# Patient Record
Sex: FEMALE | Race: WHITE | NOT HISPANIC OR LATINO | Employment: OTHER | URBAN - METROPOLITAN AREA
[De-identification: names, ages, dates, MRNs, and addresses within clinical notes are randomized per-mention and may not be internally consistent; named-entity substitution may affect disease eponyms.]

---

## 2024-07-15 ENCOUNTER — HOSPITAL ENCOUNTER (OUTPATIENT)
Facility: CLINIC | Age: 84
Setting detail: OBSERVATION
Discharge: NURSING, REHAB OR LONG-TERM CARE | End: 2024-07-17
Attending: EMERGENCY MEDICINE | Admitting: INTERNAL MEDICINE
Payer: COMMERCIAL

## 2024-07-15 DIAGNOSIS — S32.592A PUBIC RAMUS FRACTURE, LEFT, CLOSED, INITIAL ENCOUNTER (H): ICD-10-CM

## 2024-07-15 DIAGNOSIS — R29.6 FALLS FREQUENTLY: ICD-10-CM

## 2024-07-15 DIAGNOSIS — M84.48XA BILATERAL SACRAL INSUFFICIENCY FRACTURE, INITIAL ENCOUNTER: ICD-10-CM

## 2024-07-15 LAB
ALBUMIN UR-MCNC: NEGATIVE MG/DL
ANION GAP SERPL CALCULATED.3IONS-SCNC: 11 MMOL/L (ref 7–15)
APPEARANCE UR: CLEAR
BASOPHILS # BLD AUTO: 0.1 10E3/UL (ref 0–0.2)
BASOPHILS NFR BLD AUTO: 1 %
BILIRUB UR QL STRIP: NEGATIVE
BUN SERPL-MCNC: 62.4 MG/DL (ref 8–23)
CALCIUM SERPL-MCNC: 9.2 MG/DL (ref 8.8–10.2)
CHLORIDE SERPL-SCNC: 103 MMOL/L (ref 98–107)
COLOR UR AUTO: YELLOW
CREAT SERPL-MCNC: 2.24 MG/DL (ref 0.51–0.95)
EGFRCR SERPLBLD CKD-EPI 2021: 21 ML/MIN/1.73M2
EOSINOPHIL # BLD AUTO: 0.1 10E3/UL (ref 0–0.7)
EOSINOPHIL NFR BLD AUTO: 1 %
ERYTHROCYTE [DISTWIDTH] IN BLOOD BY AUTOMATED COUNT: 16.4 % (ref 10–15)
GLUCOSE SERPL-MCNC: 112 MG/DL (ref 70–99)
GLUCOSE UR STRIP-MCNC: NEGATIVE MG/DL
HCO3 SERPL-SCNC: 21 MMOL/L (ref 22–29)
HCT VFR BLD AUTO: 32.6 % (ref 35–47)
HGB BLD-MCNC: 10.3 G/DL (ref 11.7–15.7)
HGB UR QL STRIP: NEGATIVE
HYALINE CASTS: 1 /LPF
IMM GRANULOCYTES # BLD: 0.1 10E3/UL
IMM GRANULOCYTES NFR BLD: 1 %
KETONES UR STRIP-MCNC: NEGATIVE MG/DL
LEUKOCYTE ESTERASE UR QL STRIP: NEGATIVE
LYMPHOCYTES # BLD AUTO: 1.8 10E3/UL (ref 0.8–5.3)
LYMPHOCYTES NFR BLD AUTO: 20 %
MCH RBC QN AUTO: 28.8 PG (ref 26.5–33)
MCHC RBC AUTO-ENTMCNC: 31.6 G/DL (ref 31.5–36.5)
MCV RBC AUTO: 91 FL (ref 78–100)
MONOCYTES # BLD AUTO: 0.6 10E3/UL (ref 0–1.3)
MONOCYTES NFR BLD AUTO: 7 %
MUCOUS THREADS #/AREA URNS LPF: PRESENT /LPF
NEUTROPHILS # BLD AUTO: 6.3 10E3/UL (ref 1.6–8.3)
NEUTROPHILS NFR BLD AUTO: 70 %
NITRATE UR QL: NEGATIVE
NRBC # BLD AUTO: 0 10E3/UL
NRBC BLD AUTO-RTO: 0 /100
PH UR STRIP: 5 [PH] (ref 5–7)
PLATELET # BLD AUTO: 223 10E3/UL (ref 150–450)
POTASSIUM SERPL-SCNC: 4.4 MMOL/L (ref 3.4–5.3)
RBC # BLD AUTO: 3.58 10E6/UL (ref 3.8–5.2)
RBC URINE: 1 /HPF
SODIUM SERPL-SCNC: 135 MMOL/L (ref 135–145)
SP GR UR STRIP: 1.02 (ref 1–1.03)
SQUAMOUS EPITHELIAL: 1 /HPF
UROBILINOGEN UR STRIP-MCNC: NORMAL MG/DL
WBC # BLD AUTO: 9 10E3/UL (ref 4–11)
WBC URINE: 1 /HPF

## 2024-07-15 PROCEDURE — 99285 EMERGENCY DEPT VISIT HI MDM: CPT | Mod: 25 | Performed by: EMERGENCY MEDICINE

## 2024-07-15 PROCEDURE — 96374 THER/PROPH/DIAG INJ IV PUSH: CPT

## 2024-07-15 PROCEDURE — 99285 EMERGENCY DEPT VISIT HI MDM: CPT | Mod: 25

## 2024-07-15 PROCEDURE — 36415 COLL VENOUS BLD VENIPUNCTURE: CPT | Performed by: EMERGENCY MEDICINE

## 2024-07-15 PROCEDURE — 120N000001 HC R&B MED SURG/OB

## 2024-07-15 PROCEDURE — 258N000003 HC RX IP 258 OP 636: Performed by: INTERNAL MEDICINE

## 2024-07-15 PROCEDURE — 250N000011 HC RX IP 250 OP 636: Performed by: EMERGENCY MEDICINE

## 2024-07-15 PROCEDURE — 81001 URINALYSIS AUTO W/SCOPE: CPT | Performed by: EMERGENCY MEDICINE

## 2024-07-15 PROCEDURE — 96361 HYDRATE IV INFUSION ADD-ON: CPT

## 2024-07-15 PROCEDURE — 80048 BASIC METABOLIC PNL TOTAL CA: CPT | Performed by: EMERGENCY MEDICINE

## 2024-07-15 PROCEDURE — 85048 AUTOMATED LEUKOCYTE COUNT: CPT | Performed by: EMERGENCY MEDICINE

## 2024-07-15 RX ORDER — LATANOPROST 50 UG/ML
1 SOLUTION/ DROPS OPHTHALMIC DAILY
COMMUNITY

## 2024-07-15 RX ORDER — NALOXONE HYDROCHLORIDE 0.4 MG/ML
0.2 INJECTION, SOLUTION INTRAMUSCULAR; INTRAVENOUS; SUBCUTANEOUS
Status: DISCONTINUED | OUTPATIENT
Start: 2024-07-15 | End: 2024-07-17 | Stop reason: HOSPADM

## 2024-07-15 RX ORDER — NALOXONE HYDROCHLORIDE 0.4 MG/ML
0.4 INJECTION, SOLUTION INTRAMUSCULAR; INTRAVENOUS; SUBCUTANEOUS
Status: DISCONTINUED | OUTPATIENT
Start: 2024-07-15 | End: 2024-07-17 | Stop reason: HOSPADM

## 2024-07-15 RX ORDER — TIMOLOL MALEATE 5 MG/ML
1 SOLUTION/ DROPS OPHTHALMIC DAILY
COMMUNITY

## 2024-07-15 RX ORDER — ENOXAPARIN SODIUM 100 MG/ML
30 INJECTION SUBCUTANEOUS
Status: DISCONTINUED | OUTPATIENT
Start: 2024-07-15 | End: 2024-07-17 | Stop reason: HOSPADM

## 2024-07-15 RX ORDER — LATANOPROST 50 UG/ML
1 SOLUTION/ DROPS OPHTHALMIC AT BEDTIME
Status: DISCONTINUED | OUTPATIENT
Start: 2024-07-16 | End: 2024-07-17 | Stop reason: HOSPADM

## 2024-07-15 RX ORDER — LISINOPRIL 20 MG/1
20 TABLET ORAL DAILY
Status: ON HOLD | COMMUNITY
End: 2024-07-17

## 2024-07-15 RX ORDER — LISINOPRIL 10 MG/1
20 TABLET ORAL DAILY
Status: DISCONTINUED | OUTPATIENT
Start: 2024-07-16 | End: 2024-07-17

## 2024-07-15 RX ORDER — ONDANSETRON 2 MG/ML
4 INJECTION INTRAMUSCULAR; INTRAVENOUS EVERY 6 HOURS PRN
Status: DISCONTINUED | OUTPATIENT
Start: 2024-07-15 | End: 2024-07-17 | Stop reason: HOSPADM

## 2024-07-15 RX ORDER — ACETAMINOPHEN 325 MG/1
650 TABLET ORAL EVERY 4 HOURS PRN
Status: DISCONTINUED | OUTPATIENT
Start: 2024-07-15 | End: 2024-07-17 | Stop reason: HOSPADM

## 2024-07-15 RX ORDER — PROCHLORPERAZINE MALEATE 5 MG
5 TABLET ORAL EVERY 6 HOURS PRN
Status: DISCONTINUED | OUTPATIENT
Start: 2024-07-15 | End: 2024-07-17 | Stop reason: HOSPADM

## 2024-07-15 RX ORDER — CIPROFLOXACIN 500 MG/1
500 TABLET, FILM COATED ORAL EVERY 24 HOURS
Status: DISCONTINUED | OUTPATIENT
Start: 2024-07-16 | End: 2024-07-16

## 2024-07-15 RX ORDER — CALCIUM CARBONATE 500 MG/1
1000 TABLET, CHEWABLE ORAL 4 TIMES DAILY PRN
Status: DISCONTINUED | OUTPATIENT
Start: 2024-07-15 | End: 2024-07-16

## 2024-07-15 RX ORDER — SODIUM CHLORIDE, SODIUM LACTATE, POTASSIUM CHLORIDE, CALCIUM CHLORIDE 600; 310; 30; 20 MG/100ML; MG/100ML; MG/100ML; MG/100ML
INJECTION, SOLUTION INTRAVENOUS CONTINUOUS
Status: ACTIVE | OUTPATIENT
Start: 2024-07-15 | End: 2024-07-16

## 2024-07-15 RX ORDER — AMOXICILLIN 250 MG
1 CAPSULE ORAL 2 TIMES DAILY PRN
Status: DISCONTINUED | OUTPATIENT
Start: 2024-07-15 | End: 2024-07-17 | Stop reason: HOSPADM

## 2024-07-15 RX ORDER — LEVOTHYROXINE SODIUM 88 UG/1
88 TABLET ORAL DAILY
COMMUNITY

## 2024-07-15 RX ORDER — TIMOLOL MALEATE 5 MG/ML
1 SOLUTION/ DROPS OPHTHALMIC DAILY
Status: DISCONTINUED | OUTPATIENT
Start: 2024-07-16 | End: 2024-07-17 | Stop reason: HOSPADM

## 2024-07-15 RX ORDER — LIDOCAINE 40 MG/G
CREAM TOPICAL
Status: DISCONTINUED | OUTPATIENT
Start: 2024-07-15 | End: 2024-07-17 | Stop reason: HOSPADM

## 2024-07-15 RX ORDER — FENTANYL CITRATE 50 UG/ML
50 INJECTION, SOLUTION INTRAMUSCULAR; INTRAVENOUS ONCE
Status: COMPLETED | OUTPATIENT
Start: 2024-07-15 | End: 2024-07-15

## 2024-07-15 RX ORDER — MORPHINE SULFATE 2 MG/ML
1 INJECTION, SOLUTION INTRAMUSCULAR; INTRAVENOUS
Status: DISCONTINUED | OUTPATIENT
Start: 2024-07-15 | End: 2024-07-15

## 2024-07-15 RX ORDER — HYDROCODONE BITARTRATE AND ACETAMINOPHEN 5; 325 MG/1; MG/1
1-2 TABLET ORAL EVERY 4 HOURS PRN
Status: DISCONTINUED | OUTPATIENT
Start: 2024-07-15 | End: 2024-07-17 | Stop reason: HOSPADM

## 2024-07-15 RX ORDER — AMOXICILLIN 250 MG
2 CAPSULE ORAL 2 TIMES DAILY PRN
Status: DISCONTINUED | OUTPATIENT
Start: 2024-07-15 | End: 2024-07-17 | Stop reason: HOSPADM

## 2024-07-15 RX ORDER — ONDANSETRON 4 MG/1
4 TABLET, ORALLY DISINTEGRATING ORAL EVERY 6 HOURS PRN
Status: DISCONTINUED | OUTPATIENT
Start: 2024-07-15 | End: 2024-07-17 | Stop reason: HOSPADM

## 2024-07-15 RX ORDER — CALCIUM CARBONATE 500 MG/1
1000 TABLET, CHEWABLE ORAL 4 TIMES DAILY PRN
Status: DISCONTINUED | OUTPATIENT
Start: 2024-07-15 | End: 2024-07-17 | Stop reason: HOSPADM

## 2024-07-15 RX ORDER — LEVOTHYROXINE SODIUM 88 UG/1
88 TABLET ORAL DAILY
Status: DISCONTINUED | OUTPATIENT
Start: 2024-07-16 | End: 2024-07-17 | Stop reason: HOSPADM

## 2024-07-15 RX ORDER — CIPROFLOXACIN 500 MG/1
500 TABLET, FILM COATED ORAL 2 TIMES DAILY
Status: ON HOLD | COMMUNITY
End: 2024-07-17

## 2024-07-15 RX ORDER — PROCHLORPERAZINE 25 MG
12.5 SUPPOSITORY, RECTAL RECTAL EVERY 12 HOURS PRN
Status: DISCONTINUED | OUTPATIENT
Start: 2024-07-15 | End: 2024-07-17 | Stop reason: HOSPADM

## 2024-07-15 RX ADMIN — SODIUM CHLORIDE, POTASSIUM CHLORIDE, SODIUM LACTATE AND CALCIUM CHLORIDE: 600; 310; 30; 20 INJECTION, SOLUTION INTRAVENOUS at 23:32

## 2024-07-15 RX ADMIN — FENTANYL CITRATE 50 MCG: 50 INJECTION, SOLUTION INTRAMUSCULAR; INTRAVENOUS at 15:17

## 2024-07-15 ASSESSMENT — ACTIVITIES OF DAILY LIVING (ADL)
ADLS_ACUITY_SCORE: 36
CHANGE_IN_FUNCTIONAL_STATUS_SINCE_ONSET_OF_CURRENT_ILLNESS/INJURY: YES
NUMBER_OF_TIMES_PATIENT_HAS_FALLEN_WITHIN_LAST_SIX_MONTHS: 4
ADLS_ACUITY_SCORE: 35
ADLS_ACUITY_SCORE: 36
ADLS_ACUITY_SCORE: 33
ADLS_ACUITY_SCORE: 34
ADLS_ACUITY_SCORE: 36
TOILETING_ISSUES: YES
TOILETING: 1-->ASSISTANCE (EQUIPMENT/PERSON) NEEDED
TOILETING: 0-->NOT TOILET TRAINED OR ASSISTANCE NEEDED (DEVELOPMENTALLY APPROPRIATE)
TOILETING_MANAGEMENT: MOBILITY ISSUES
ADLS_ACUITY_SCORE: 36
VISION_MANAGEMENT: READING GLASSES
DRESSING/BATHING_DIFFICULTY: YES
ADLS_ACUITY_SCORE: 36
DOING_ERRANDS_INDEPENDENTLY_DIFFICULTY: YES
DIFFICULTY_EATING/SWALLOWING: NO
DRESSING/BATHING: BATHING DIFFICULTY, ASSISTANCE 1 PERSON
DRESSING/BATHING_MANAGEMENT: FAMILY HELP
WALKING_OR_CLIMBING_STAIRS: AMBULATION DIFFICULTY, REQUIRES EQUIPMENT;STAIR CLIMBING DIFFICULTY, ASSISTANCE 1 PERSON
ADLS_ACUITY_SCORE: 36
CONCENTRATING,_REMEMBERING_OR_MAKING_DECISIONS_DIFFICULTY: YES
WEAR_GLASSES_OR_BLIND: YES
WALKING_OR_CLIMBING_STAIRS_DIFFICULTY: YES
ADLS_ACUITY_SCORE: 36
ADLS_ACUITY_SCORE: 33
TOILETING_ASSISTANCE: TOILETING DIFFICULTY, REQUIRES EQUIPMENT
EQUIPMENT_CURRENTLY_USED_AT_HOME: WALKER, ROLLING;GRAB BAR, TOILET
FALL_HISTORY_WITHIN_LAST_SIX_MONTHS: YES
DIFFICULTY_COMMUNICATING: NO
HEARING_DIFFICULTY_OR_DEAF: NO

## 2024-07-15 ASSESSMENT — COLUMBIA-SUICIDE SEVERITY RATING SCALE - C-SSRS
6. HAVE YOU EVER DONE ANYTHING, STARTED TO DO ANYTHING, OR PREPARED TO DO ANYTHING TO END YOUR LIFE?: NO
2. HAVE YOU ACTUALLY HAD ANY THOUGHTS OF KILLING YOURSELF IN THE PAST MONTH?: NO
1. IN THE PAST MONTH, HAVE YOU WISHED YOU WERE DEAD OR WISHED YOU COULD GO TO SLEEP AND NOT WAKE UP?: NO

## 2024-07-15 NOTE — ED PROVIDER NOTES
History     Chief Complaint   Patient presents with    Back Pain    Fall     HPI  Selena Lyons is a 84 year old female who presents with family over concern of back pain and abnormal MRI.  Patient lives at home alone.  Has been having frequent falls.  Recently had urinary tract infection and was treated with an antibiotic.  Felt that UTI was causing the falls.  Since she was having more pain and having more difficulty with ambulation, they brought her to her primary provider on Friday.  MRI was completed at that time.  See copy of the results below.  They were called today with the results of the sacral insufficiency fractures and possible pubic ramus fracture.  Since patient is having more difficulty with mobility, transfers, and ADLs, as well as uncontrolled pain, came to the ED for further evaluation.  No fevers noted, no vomiting.  No recurrent falls, she has been unable to get out of bed.  She did have some Tylenol and ibuprofen today to help with her symptoms, but continues to have pain.    MRI result copied below  Narrative    EXAM:  MRI HIP RT WITHOUT CONTRAST    INDICATION:  Hip pain, chronic, articular cartilage eval, xray done,Multiple falls,Pain of  right hip    TECHNIQUE:  Multiplanar multisequence MRI hip without contrast.  Included large  field-of-view T1 and STIR sequences    COMPARISON:  None available to review    FINDINGS:  Osseous:    Contusion or undisplaced fracture left L5 transverse process.    Bilateral sacrum insufficiency fractures.  Not optimally evaluated but without  definite displacement.    Some asymmetric edema like signal left superior and inferior pubic ramus also  not optimally evaluated on this right hip MRI either undisplaced fracture or  contusion.    Negative for proximal femur fracture    Tendons:    Gluteal: Chronic insertional tearing gluteal minimus and anterior medius fibers  with a chronic full-thickness component and tendon scarring.  Posterior fibers  of medius  intact.    Rectus femoris: Normal    IT band: Normal    Hamstring origin: Chronic attritional partial-thickness tearing and  degeneration.  No acute injury.    Iliopsoas: Normal    Common adductor/rectus aponeurosis:    Degenerated without focal tear    Hip joint:    Small simple effusion    Labrum: Chronic diffuse degenerative tearing incidental    Cartilage: Diffuse relatively low-grade chondrosis    Soft tissues and bursae:    Moderate diffuse pelvic, thigh, paraspinal sarcopenia.    Severe fatty infiltration gluteal minimus and moderate bilateral gluteal medius.    No distended bursa.  No hematoma.    Low-grade muscle strains left short hip adductors.    Not optimally evaluated on this right hip MRI protocol.    IMPRESSION:  1. Please note, none of patient's outside imaging currently available for  review.  May want to consider retrieval for comparison purposes.  Please  correlate with outside imaging and outside reports  2. Acute bilateral sacral insufficiency fractures  3. Please note, the left hip not well evaluated but findings regarding left  superior inferior pubic ramus and left L5 transverse process could reflect  contusions or undisplaced fractures.  4. Left adductor muscle strains  5. Other chronic observations as discussed including chronic adductor minimus  and medius insertional tears with associated muscle denervation/fatty  replacement  6.  CODE 3:  Report contains unexpected findings requiring further evaluation  or follow-up.  Exam End: 07/12/24  7:56 PM    Specimen Collected: 07/14/24  8:20 AM Last Resulted: 07/14/24  8:30 AM   Received From: [facility]        Allergies:  Allergies   Allergen Reactions    Cephalexin Hives    Codeine Hives    Aspirin Other (See Comments)    Doxycycline Rash       Problem List:    There are no problems to display for this patient.       Past Medical History:    No past medical history on file.    Past Surgical History:    No past surgical  "history on file.    Family History:    No family history on file.    Social History:  Marital Status:  Single [1]        Medications:    No current outpatient medications on file.        Review of Systems   All other systems reviewed and are negative.      Physical Exam   BP: (!) 147/87  Pulse: 81  Temp: 98.3  F (36.8  C)  Resp: 16  Height: 167.6 cm (5' 5.98\")  Weight: 59.4 kg (131 lb)  SpO2: 96 %      Physical Exam  Vitals and nursing note reviewed.   Constitutional:       General: She is not in acute distress.  HENT:      Head: Normocephalic and atraumatic.   Pulmonary:      Effort: Pulmonary effort is normal.   Musculoskeletal:         General: No deformity.      Lumbar back: Tenderness present. Decreased range of motion.      Comments: Sitting in wheelchair, unable to transfer secondary to back pain       Skin:     General: Skin is warm and dry.   Neurological:      General: No focal deficit present.      Mental Status: She is alert and oriented to person, place, and time.      Comments: Generalized weakness of lower extremities         ED Course        Procedures              Critical Care time:  none               Results for orders placed or performed during the hospital encounter of 07/15/24 (from the past 24 hour(s))   CBC with platelets differential    Narrative    The following orders were created for panel order CBC with platelets differential.  Procedure                               Abnormality         Status                     ---------                               -----------         ------                     CBC with platelets and d...[[id number]]  Abnormal            Final result                 Please view results for these tests on the individual orders.   Basic metabolic panel   Result Value Ref Range    Sodium 135 135 - 145 mmol/L    Potassium 4.4 3.4 - 5.3 mmol/L    Chloride 103 98 - 107 mmol/L    Carbon Dioxide (CO2) 21 (L) 22 - 29 mmol/L    Anion Gap 11 7 - 15 mmol/L    Urea Nitrogen 62.4 " (H) 8.0 - 23.0 mg/dL    Creatinine 2.24 (H) 0.51 - 0.95 mg/dL    GFR Estimate 21 (L) >60 mL/min/1.73m2    Calcium 9.2 8.8 - 10.2 mg/dL    Glucose 112 (H) 70 - 99 mg/dL   CBC with platelets and differential   Result Value Ref Range    WBC Count 9.0 4.0 - 11.0 10e3/uL    RBC Count 3.58 (L) 3.80 - 5.20 10e6/uL    Hemoglobin 10.3 (L) 11.7 - 15.7 g/dL    Hematocrit 32.6 (L) 35.0 - 47.0 %    MCV 91 78 - 100 fL    MCH 28.8 26.5 - 33.0 pg    MCHC 31.6 31.5 - 36.5 g/dL    RDW 16.4 (H) 10.0 - 15.0 %    Platelet Count 223 150 - 450 10e3/uL    % Neutrophils 70 %    % Lymphocytes 20 %    % Monocytes 7 %    % Eosinophils 1 %    % Basophils 1 %    % Immature Granulocytes 1 %    NRBCs per 100 WBC 0 <1 /100    Absolute Neutrophils 6.3 1.6 - 8.3 10e3/uL    Absolute Lymphocytes 1.8 0.8 - 5.3 10e3/uL    Absolute Monocytes 0.6 0.0 - 1.3 10e3/uL    Absolute Eosinophils 0.1 0.0 - 0.7 10e3/uL    Absolute Basophils 0.1 0.0 - 0.2 10e3/uL    Absolute Immature Granulocytes 0.1 <=0.4 10e3/uL    Absolute NRBCs 0.0 10e3/uL   UA with Microscopic reflex to Culture    Specimen: Urine, Midstream   Result Value Ref Range    Color Urine Yellow Colorless, Straw, Light Yellow, Yellow    Appearance Urine Clear Clear    Glucose Urine Negative Negative mg/dL    Bilirubin Urine Negative Negative    Ketones Urine Negative Negative mg/dL    Specific Gravity Urine 1.017 1.003 - 1.035    Blood Urine Negative Negative    pH Urine 5.0 5.0 - 7.0    Protein Albumin Urine Negative Negative mg/dL    Urobilinogen Urine Normal Normal, 2.0 mg/dL    Nitrite Urine Negative Negative    Leukocyte Esterase Urine Negative Negative    Mucus Urine Present (A) None Seen /LPF    RBC Urine 1 <=2 /HPF    WBC Urine 1 <=5 /HPF    Squamous Epithelials Urine 1 <=1 /HPF    Hyaline Casts Urine 1 <=2 /LPF    Narrative    Urine Culture not indicated       Medications   fentaNYL (PF) (SUBLIMAZE) injection 50 mcg (50 mcg Intravenous $Given 7/15/24 6581)       Assessments & Plan (with Medical  Decision Making)  Selena is an 84-year-old female presenting with family over concern of back pain, abnormal MRI, and inability to bear weight at home.  See history and physical exam as above  84-year-old female in no acute distress, is mildly hypertensive with blood pressure 147/87, but otherwise vitally stable and afebrile.  She is sitting in wheelchair, she cannot get out of the wheelchair to help transfer to the bed due to her back pain.  Family brought copy of the MRI results.  They were told by Selena's primary provider to come to the ER to help with placement.  Explained to the family that we will need to discuss with admitting physician, as we cannot often find placement from the ED.  Will also involve orthopedic surgery to make sure that there are no other special precautions or treatment to consider  1509 poke with Dr. Taylor, orthopedic surgery on-call, and discussed the MRI findings.  This is a nonoperative injury.  He states the patient is okay to do protected weightbearing as tolerated.  Okay for pain control and PT evaluation and treatment.  Would recommend follow-up with Ortho in clinic in 6 weeks.  Bed is available at this facility, but not till after 7 PM.  Updated family on the recommendations.  Will plan to have PT and OT consult formally while patient is admitted.  Will also work on pain control.  Was given a dose of IV fentanyl due to reported allergy to codeine.  UA was collected and does not reveal sign of an acute urinary tract infection.  Spoke with Dr. Mares, hospitalist on-call.  Accepted the patient for observation.     I have reviewed the nursing notes.    I have reviewed the findings, diagnosis, plan and need for follow up with the patient.       ED to Inpatient Handoff:    Discussed with Dr. Mares at 1915  Patient accepted for Observation Stay  Pending studies include N/A  Code Status: Not Addressed             Medical Decision Making  The patient's presentation was of high  complexity (a chronic illness severe exacerbation, progression, or side effect of treatment).    The patient's evaluation involved:  review of 1 test result(s) ordered prior to this encounter (MRI)  ordering and/or review of 1 test(s) in this encounter (see separate area of note for details)    The patient's management necessitated high risk (a decision regarding hospitalization).        New Prescriptions    No medications on file       Final diagnoses:   Bilateral sacral insufficiency fracture, initial encounter   Pubic ramus fracture, left, closed, initial encounter (H)   Falls frequently       7/15/2024   [facility] EMERGENCY DEPT       Mayte Comer DO  07/18/24 9139

## 2024-07-15 NOTE — ED TRIAGE NOTES
Pt presents with abnormal MRI . Pt has been having falls. Pt lives alone. Pt recently had UTI. Treated. Family concerned she may have another UTI. Pt has L5 vertebrae and sacrum fracture. PLEASE GET BED WEIGHT      Triage Assessment (Adult)       Row Name 07/15/24 1251          Triage Assessment    Airway WDL WDL        Respiratory WDL    Respiratory WDL WDL        Skin Circulation/Temperature WDL    Skin Circulation/Temperature WDL WDL        Cardiac WDL    Cardiac WDL WDL        Peripheral/Neurovascular WDL    Peripheral Neurovascular WDL WDL        Cognitive/Neuro/Behavioral WDL    Cognitive/Neuro/Behavioral WDL WDL

## 2024-07-15 NOTE — MEDICATION SCRIBE - ADMISSION MEDICATION HISTORY
Medication Scribe Admission Medication History    Admission medication history is complete. The information provided in this note is only as accurate as the sources available at the time of the update.    Information Source(s): Patient and CareEverywhere/SureScripts via in-person    Pertinent Information: no meds    Changes made to PTA medication list:  Added: None  Deleted: None  Changed: None    Allergies reviewed with patient and updates made in EHR: no    Medication History Completed By: NORMA KING 7/15/2024 5:31 PM    No outpatient medications have been marked as taking for the 7/15/24 encounter (Hospital Encounter).

## 2024-07-16 ENCOUNTER — APPOINTMENT (OUTPATIENT)
Dept: PHYSICAL THERAPY | Facility: CLINIC | Age: 84
End: 2024-07-16
Attending: INTERNAL MEDICINE
Payer: COMMERCIAL

## 2024-07-16 LAB
ANION GAP SERPL CALCULATED.3IONS-SCNC: 10 MMOL/L (ref 7–15)
BUN SERPL-MCNC: 53 MG/DL (ref 8–23)
CALCIUM SERPL-MCNC: 8.6 MG/DL (ref 8.8–10.2)
CHLORIDE SERPL-SCNC: 107 MMOL/L (ref 98–107)
CREAT SERPL-MCNC: 1.84 MG/DL (ref 0.51–0.95)
EGFRCR SERPLBLD CKD-EPI 2021: 26 ML/MIN/1.73M2
ERYTHROCYTE [DISTWIDTH] IN BLOOD BY AUTOMATED COUNT: 16.6 % (ref 10–15)
FERRITIN SERPL-MCNC: 610 NG/ML (ref 11–328)
GLUCOSE SERPL-MCNC: 100 MG/DL (ref 70–99)
HCO3 SERPL-SCNC: 20 MMOL/L (ref 22–29)
HCT VFR BLD AUTO: 28.8 % (ref 35–47)
HGB BLD-MCNC: 9.1 G/DL (ref 11.7–15.7)
IRON BINDING CAPACITY (ROCHE): 196 UG/DL (ref 240–430)
IRON SATN MFR SERPL: 27 % (ref 15–46)
IRON SERPL-MCNC: 53 UG/DL (ref 37–145)
MCH RBC QN AUTO: 28.9 PG (ref 26.5–33)
MCHC RBC AUTO-ENTMCNC: 31.6 G/DL (ref 31.5–36.5)
MCV RBC AUTO: 91 FL (ref 78–100)
PLATELET # BLD AUTO: 193 10E3/UL (ref 150–450)
POTASSIUM SERPL-SCNC: 4.3 MMOL/L (ref 3.4–5.3)
RBC # BLD AUTO: 3.15 10E6/UL (ref 3.8–5.2)
SODIUM SERPL-SCNC: 137 MMOL/L (ref 135–145)
T4 FREE SERPL-MCNC: 1.11 NG/DL (ref 0.9–1.7)
TSH SERPL DL<=0.005 MIU/L-ACNC: 5.85 UIU/ML (ref 0.3–4.2)
WBC # BLD AUTO: 7.7 10E3/UL (ref 4–11)

## 2024-07-16 PROCEDURE — 99207 PR NOT IN PERSON INPATIENT CONSULT STATISTICAL MARKER: CPT | Performed by: INTERNAL MEDICINE

## 2024-07-16 PROCEDURE — 84439 ASSAY OF FREE THYROXINE: CPT | Performed by: INTERNAL MEDICINE

## 2024-07-16 PROCEDURE — 96372 THER/PROPH/DIAG INJ SC/IM: CPT | Performed by: INTERNAL MEDICINE

## 2024-07-16 PROCEDURE — 258N000003 HC RX IP 258 OP 636: Performed by: INTERNAL MEDICINE

## 2024-07-16 PROCEDURE — 80048 BASIC METABOLIC PNL TOTAL CA: CPT | Performed by: INTERNAL MEDICINE

## 2024-07-16 PROCEDURE — 250N000009 HC RX 250: Performed by: INTERNAL MEDICINE

## 2024-07-16 PROCEDURE — 84443 ASSAY THYROID STIM HORMONE: CPT | Performed by: INTERNAL MEDICINE

## 2024-07-16 PROCEDURE — G0378 HOSPITAL OBSERVATION PER HR: HCPCS

## 2024-07-16 PROCEDURE — 83550 IRON BINDING TEST: CPT | Performed by: INTERNAL MEDICINE

## 2024-07-16 PROCEDURE — 82728 ASSAY OF FERRITIN: CPT | Performed by: INTERNAL MEDICINE

## 2024-07-16 PROCEDURE — 36415 COLL VENOUS BLD VENIPUNCTURE: CPT | Performed by: INTERNAL MEDICINE

## 2024-07-16 PROCEDURE — 97530 THERAPEUTIC ACTIVITIES: CPT | Mod: GP | Performed by: PHYSICAL THERAPIST

## 2024-07-16 PROCEDURE — 250N000011 HC RX IP 250 OP 636: Performed by: INTERNAL MEDICINE

## 2024-07-16 PROCEDURE — 85027 COMPLETE CBC AUTOMATED: CPT | Performed by: INTERNAL MEDICINE

## 2024-07-16 PROCEDURE — 250N000013 HC RX MED GY IP 250 OP 250 PS 637: Performed by: INTERNAL MEDICINE

## 2024-07-16 PROCEDURE — 97162 PT EVAL MOD COMPLEX 30 MIN: CPT | Mod: GP | Performed by: PHYSICAL THERAPIST

## 2024-07-16 PROCEDURE — 96361 HYDRATE IV INFUSION ADD-ON: CPT

## 2024-07-16 PROCEDURE — 99233 SBSQ HOSP IP/OBS HIGH 50: CPT | Performed by: INTERNAL MEDICINE

## 2024-07-16 RX ORDER — HYDRALAZINE HYDROCHLORIDE 25 MG/1
25 TABLET, FILM COATED ORAL 4 TIMES DAILY PRN
Status: DISCONTINUED | OUTPATIENT
Start: 2024-07-16 | End: 2024-07-17 | Stop reason: HOSPADM

## 2024-07-16 RX ADMIN — SODIUM CHLORIDE, POTASSIUM CHLORIDE, SODIUM LACTATE AND CALCIUM CHLORIDE: 600; 310; 30; 20 INJECTION, SOLUTION INTRAVENOUS at 09:34

## 2024-07-16 RX ADMIN — LEVOTHYROXINE SODIUM 88 MCG: 0.09 TABLET ORAL at 08:41

## 2024-07-16 RX ADMIN — LATANOPROST 1 DROP: 50 SOLUTION/ DROPS OPHTHALMIC at 21:09

## 2024-07-16 RX ADMIN — ENOXAPARIN SODIUM 30 MG: 30 INJECTION SUBCUTANEOUS at 21:09

## 2024-07-16 RX ADMIN — TIMOLOL MALEATE 1 DROP: 5 SOLUTION OPHTHALMIC at 08:41

## 2024-07-16 RX ADMIN — ENOXAPARIN SODIUM 30 MG: 30 INJECTION SUBCUTANEOUS at 00:18

## 2024-07-16 ASSESSMENT — ACTIVITIES OF DAILY LIVING (ADL)
ADLS_ACUITY_SCORE: 35
ADLS_ACUITY_SCORE: 35
ADLS_ACUITY_SCORE: 44
ADLS_ACUITY_SCORE: 35
ADLS_ACUITY_SCORE: 44
ADLS_ACUITY_SCORE: 35
ADLS_ACUITY_SCORE: 44
ADLS_ACUITY_SCORE: 44
ADLS_ACUITY_SCORE: 35
ADLS_ACUITY_SCORE: 35
ADLS_ACUITY_SCORE: 44
ADLS_ACUITY_SCORE: 44
ADLS_ACUITY_SCORE: 35
DEPENDENT_IADLS:: CLEANING;COOKING;LAUNDRY;SHOPPING;MEAL PREPARATION;MEDICATION MANAGEMENT;MONEY MANAGEMENT;TRANSPORTATION
ADLS_ACUITY_SCORE: 35
ADLS_ACUITY_SCORE: 35
ADLS_ACUITY_SCORE: 44
ADLS_ACUITY_SCORE: 35

## 2024-07-16 NOTE — UTILIZATION REVIEW
"Admission Status; Secondary Review Determination     Under the authority of the Utilization Management Committee, the utilization review process indicated a secondary review on the above patient.  The review outcome is based on review of the medical records, discussions with staff, and applying clinical experience noted on the date of the review.          (x) Observation Status Appropriate - This patient does not meet hospital inpatient criteria and is placed in observation status. If this patient's primary payer is Medicare and was admitted as an inpatient, Condition Code 44 should be used and patient status changed to \"observation\".     RATIONALE FOR DETERMINATION   84-year-old female with hypertension, glaucoma, frequent falls presented to the emergency department on 7/15/2024.  Was seen in her primary care clinic and had imaging ordered late last week on 7/12/2024.  MRI of the right hip showed acute bilateral sacral insufficiency fractures.  Brain MRI without any acute intracranial process.  The patient lives alone but has some family help during the daytime, without any supervision at night.    Does not have any need for IV antibiotics for localized infection.  Has completed a course of quinolone antibiotic in the outpatient setting for presumed UTI.  Not needing any opiates either oral or IV formulations for pain control.  PT and OT consultations for anticipated TCU placement.  Orthopedics consultation obtained and no operative intervention is recommended.    She has known CKD but severity is unclear.  Has gotten IV fluids overnight with GFR essentially unchanged (was 21 and this morning 26).     The severity of illness, intensity of service provided, expected LOS and risk for adverse outcome make the care appropriate for further observation; however, doesn't meet criteria for hospital inpatient admission. Dr. Noel notified of this determination via [employer].    However, if the patient develops an acute " condition such as encephalopathy or an escalation in pain medication need, can reconsider for inpatient status.    This document was produced using voice recognition software.      The information on this document is developed by the utilization review team in order for the business office to ensure compliance.  This only denotes the appropriateness of proper admission status and does not reflect the quality of care rendered.         The definitions of Inpatient Status and Observation Status used in making the determination above are those provided in the CMS Coverage Manual, Chapter 1 and Chapter 6, section 70.4.      Sincerely,  Noy Smart MD  Utilization Review  Physician Advisor  Health system.

## 2024-07-16 NOTE — H&P
HOSPITALIST TELEMED ADMISSION H&P    Service Date : 7/16/2024  Dr. Josefa LYN am located in [place]    Selena Lyons is located in [place] at [facility]    ASHLYN Trent on Med/Surg unit is assisting me today with this patient.    chief complaint: Frequent Falls    History of Present Illness:  Selena Lyons is a 84 year old female  with hypertension and glaucoma, history of frequent falls, presenting to ED on Monday after she was instructed to come to the emergency room by her clinic doctor.   She was diagnosed with a urinary tract infection  and treated with Cipro, he thought the UTI was cause of her frequent falls.    Radiological diagnostics were obtained by the clinic provider showing she had sacral fractures and pubic rami fracture as a result of her falls. The patient complained of significant back pain.  She denied any loss of consciousness  with her most recent fall.  The patient lives alone however her adult children visit her during the day but they are not able to stay with her at night.  Orthopedic consultation was obtained in the emergency room and the fractures were determined to be non-operative.  Patient denied any fever, chills, chest pain, shortness of breath, nausea, vomiting or diarrhea, patient denied any burning with urination.    Emergency Room Course - in the emergency room, serologies for CMP, CBC,  Urinalysis with reflex culture, TSH, free T4, free T3,       Radiological diagnostics included:      Narrative     Original Report    EXAM:  X-RAY HIPS BILATERAL TWO VIEWS (INCLUDING VIEWS OF THE PELVIS WHEN PERFORMED) RIGHT    CLINICAL INFORMATION:    Patient presents with multiple falls.  Worsening right hip pain.  Pelvic pain.    TECHNICAL INFORMATION:  Outside images are submitted from [facility].  These  consist of 3 images.    INTERPRETATION:  Comparison: None    The visible portions the pelvis are unremarkable.  The inferior ischial rami  are difficult to  evaluate on several images.  No pelvic fracture.  Facet degeneration bilaterally at L5-S1.  Bilateral SI joint space sclerosis.    Right hip: No fracture.  No dislocation identified.  Mild degenerative osteoarthritis.  Left hip: Mild degenerative osteoarthritis.  No fracture.  No dislocation.  Incompletely  evaluated on the AP view.    CONCLUSION:    1.  No obvious fractures are identified of either hip.  2.  No obvious pelvic fractures.    CTC:        Read by: DONTA Davidson M.D.  Reviewed and Electronically Signed by: DONTA Davidson M.D.  Exam End: --    Specimen Collected: 07/09/24 11:59 PM Last Resulted: 07/11/24 12:07 PM   [facility]: [facility]  Result Received: 07/15/24 12:48 PM           EXAM:  MRI HIP RT WITHOUT CONTRAST    INDICATION:  Hip pain, chronic, articular cartilage eval, xray done,Multiple falls,Pain of  right hip    TECHNIQUE:  Multiplanar multisequence MRI hip without contrast.  Included large  field-of-view T1 and STIR sequences    COMPARISON:  None available to review    FINDINGS:  Osseous:    Contusion or undisplaced fracture left L5 transverse process.    Bilateral sacrum insufficiency fractures.  Not optimally evaluated but without  definite displacement.    Some asymmetric edema like signal left superior and inferior pubic ramus also  not optimally evaluated on this right hip MRI either undisplaced fracture or  contusion.    Negative for proximal femur fracture    Tendons:    Gluteal: Chronic insertional tearing gluteal minimus and anterior medius fibers  with a chronic full-thickness component and tendon scarring.  Posterior fibers  of medius intact.    Rectus femoris: Normal    IT band: Normal    Hamstring origin: Chronic attritional partial-thickness tearing and  degeneration.  No acute injury.    Iliopsoas: Normal    Common adductor/rectus aponeurosis:    Degenerated without focal tear    Hip joint:    Small simple  effusion    Labrum: Chronic diffuse degenerative tearing incidental    Cartilage: Diffuse relatively low-grade chondrosis    Soft tissues and bursae:    Moderate diffuse pelvic, thigh, paraspinal sarcopenia.    Severe fatty infiltration gluteal minimus and moderate bilateral gluteal medius.    No distended bursa.  No hematoma.    Low-grade muscle strains left short hip adductors.    Not optimally evaluated on this right hip MRI protocol.    IMPRESSION:  1. Please note, none of patient's outside imaging currently available for  review.  May want to consider retrieval for comparison purposes.  Please  correlate with outside imaging and outside reports  2. Acute bilateral sacral insufficiency fractures  3. Please note, the left hip not well evaluated but findings regarding left  superior inferior pubic ramus and left L5 transverse process could reflect  contusions or undisplaced fractures.  4. Left adductor muscle strains  5. Other chronic observations as discussed including chronic adductor minimus  and medius insertional tears with associated muscle denervation/fatty  replacement  6.  CODE 3:  Report contains unexpected findings requiring further evaluation  or follow-up.  Exam End: 07/12/24  7:56 PM    Specimen Collected: 07/14/24  8:20 AM Last Resulted: 07/14/24  8:30 AM   Received From: [facility]  Result Received: 07/15/24 12:48 PM             EXAM:  MRI HEAD ROUTINE WITHOUT CONTRAST    INDICATION:  Mental status change, unknown cause,Memory changes,Confusion,Multiple  falls,Primary hypertension,Pain of right hip,Hypothyroidism due to acquired  atrophy of thyroid    TECHNIQUE:  Multiplanar, multisequence magnetic resonance imaging of the brain is performed  without IV contrast administration.    COMPARISON:  Head CT August 2, 2012.    FINDINGS:  Diffusion-weighted images appear unremarkable.  No definite restricted  diffusion.    Enlargement of lateral ventricles and cortical sulci suggest  diffuse cerebral  atrophy.  Extensive white matter disease, mostly periventricular in location.  Symmetric white matter signal is seen involving occipital lobes bilaterally.  Encephalomalacia is seen in the right posterior parietal/occipital region,  suggesting chronic infarction in this location.  Gray-white differentiation is  preserved.  Visualized vascular structures demonstrate grossly unremarkable  flow voids.  Scattered lacunar infarcts are seen within cerebellar hemispheres.  Old lacunar infarcts are seen within thalamic hemispheres bilaterally.  No  evidence for acute intracerebral hemorrhage, or acute nonhemorrhagic infarct.  No definite mass effect or midline shift.    Sella, suprasellar regions appear unremarkable.  Bilateral cataract surgery has  been performed.  Paranasal sinuses appear unremarkable.  No definite abnormal  temporal bone signal.    IMPRESSION:  1. Moderate diffuse cerebral atrophy much more pronounced as compared to  previous.  2. Extensive white matter disease, most likely representing chronic small  vessel ischemic change.  Symmetric white matter signal abnormality is seen  posteriorly, involving occipital lobes, which can be seen with PRES.  Question  history of hypertension.  3. 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  4. Old lacunar infarcts within cerebellar hemispheres and thalamic hemispheres  bilaterally.  5. No evidence for acute intracerebral hemorrhage, or acute nonhemorrhagic  infarct.  Exam End: 07/12/24  7:49 PM    Specimen Collected: 07/12/24  8:00 PM Last Resulted: 07/12/24  8:03 PM   Received From: [facility]  Result Received: 07/15/24 12:48 PM           Past Medical History  No past medical history on file.   Patient Active Problem List   Diagnosis    Falls frequently    Bilateral sacral insufficiency fracture, initial encounter    Pubic ramus fracture, left, closed, initial encounter (H)         Past Surgical History  No past  "surgical history on file.   Social History  Selena      Family History  Selena's family history is not on file.    Home Medications  Current Outpatient Medications   Medication Instructions    ciprofloxacin (CIPRO) 500 mg, Oral, 2 TIMES DAILY, Take 1 tab by mouth every 12 hours for 7 days    latanoprost (XALATAN) 0.005 % ophthalmic solution 1 drop, Both Eyes, DAILY, 1 drop to both eyes at bedtime    levothyroxine (SYNTHROID/LEVOTHROID) 88 mcg, Oral, DAILY, 1 tablet by mouth every morning.     lisinopril (ZESTRIL) 20 mg, Oral, DAILY, 1 tablet by mouth every morning.     timolol maleate (TIMOPTIC) 0.5 % ophthalmic solution 1 drop, Both Eyes, DAILY, 1 drop to both eyes every morning.        Allergies  Allergies   Allergen Reactions    Cephalexin Hives    Codeine Hives    Aspirin Other (See Comments)    Doxycycline Rash        10 pt ROS neg except as noted in HPI    Physical Exam:  I performed all aspects of the physical examination via Telemedicine associated with two way audio and video communication.    Vital Signs: Blood pressure (!) 147/57, pulse 90, temperature 98.3  F (36.8  C), temperature source Oral, resp. rate 16, height 1.676 m (5' 5.98\"), weight 59.7 kg (131 lb 9.8 oz), SpO2 99%.    Physical Exam    Gen:  Frail elderly female, in no acute distress, lying semi-supine in her hospital bed.  HEENT:  NC/AT,  EOMI, hearing intact to voice  Resp:  No accessory muscle use, breath sounds clear; no wheezes no rales no rhonchi  Card:  No murmur, normal S1, S2   Abd:  Soft per RN exam, firm nodule just above the umbilicus,  no TTP, non-distended, normoactive bowel sounds are present,  Skin: right medial shin noted with an abrasion/healing laceration of concern  Ext: per nursing assessment 1+ pitting edema bilaterally to the midshins  Psych:  Not anxious, not agitated      DATA:    I&O: I/O last 3 completed shifts:  In: -   Out: 200 [Urine:200]     Labs:  I have personally reviewed the following studies:  Recent " Labs   Lab 07/15/24  1516   POTASSIUM 4.4   CHLORIDE 103   CO2 21*   BUN 62.4*      Recent Labs   Lab 07/15/24  1516   WBC 9.0   HGB 10.3*   HCT 32.6*             Imaging: ER imaging reviewed      Misc  DVT prophylaxis: Lovenox  Code Status:  special code intubation, no CPR, no medications,  and no defibrillation  Cardiac Monitoring:  no cardiac telemetry  Castorena: no  Lines:  peripheral IV  Diet:  regular diet      ASSESSMENT/PLAN:  84-year-old frail elderly female, frequent falls,  resulting in non-operative bilateral sacral insufficiency fractures, and pubic ramus fracture on the left,  lives alone, will be admitted for  acute renal insufficiency, pain management and  placement for TCU, rehab facility and or nursing home.      This patient's current admission to an acute care setting is essential for the treatment of  Nonoperative fractures and BANDAR.     The patient's recovery is dependent on the following treatments of  pain management for nonoperative fractures, gentle IV hydration, correcting any electrolyte imbalances to stabilize this condition.     The patient is at risk of developing further complications of  pneumonia,  and or organ failure, if not treated in an acute care setting. I expect the patient's hospital stay to require 2 - 4 days      Our patient will be admitted under the hospitalist services and further management to be based on patient's clinical progress.       #ACTIVE PROBLEM LIST:    # Hypertension:  147/57  Stable. Continue with daily vitals, hold lisinopril secondary to BANDAR     #Hypothyroidism:  stable, continue with levothyroxine    #Glaucoma:  Continue with timolol and Xalatan    # Anemia: based on hgb <11,  will monitor with CBC      Clinically Significant Risk Factors Present on Admission            As the provider for the telehealth service, I attest that I introduced myself to the patient and provided my credentials. Based on review of the patient s chart and/or a discussion  with members of the patient s treatment team, I determined that telemedicine via a real-time, two-way, interactive audio and video platform is an appropriate means of providing this service. The patient and I mutually agreed that this visit is appropriate for telemedicine as well.    The encounter was approximately 10 minutes. The nurse was present for the duration of the encounter.    Chart reviewed,labs and available imaging reviewed,consultant notes reviewed. Previous available medical records have been reviewed  Care plan discussed with care team    I have seen and examined the patient today. Documentation for this visit was completed using a template. Everything documented was personally performed today with the necessary additions, deletions and changes made as appropriate with the diagnoses being listed in no particular order of clinical relevance.    Josefa Mares MD, FACP  Securely message with the Vocera Web Console (learn more here)  Text page via [employer] Paging/Directory

## 2024-07-16 NOTE — CONSULTS
Care Management Initial Consult    General Information  Assessment completed with: Patient, Children, Daughter- Sherrie  Type of CM/SW Visit: Initial Assessment    Primary Care Provider verified and updated as needed: Yes   Readmission within the last 30 days: no previous admission in last 30 days      Reason for Consult: discharge planning  Advance Care Planning: Advance Care Planning Reviewed: no concerns identified          Communication Assessment  Patient's communication style: spoken language (English or Bilingual)    Hearing Difficulty or Deaf: no   Wear Glasses or Blind: yes    Cognitive  Cognitive/Neuro/Behavioral: .WDL except, orientation  Level of Consciousness: alert, confused  Arousal Level: opens eyes spontaneously  Orientation: disoriented to, place, time, situation  Mood/Behavior: calm, cooperative  Best Language: 0 - No aphasia  Speech: clear    Living Environment:   People in home: alone, other (see comments) (Dtr Sherrie in home 7am-5pm Mon-Sat. Son helps with supper. Pt home alone overnights.)     Current living Arrangements: house      Able to return to prior arrangements: no (Family reports they can no longer continue with current care of patient at home)       Family/Social Support:  Care provided by: self, child(john), other (see comments) (private pay person 1x per week for bathing)  Provides care for: pet(s), no one, unable/limited ability to care for self  Marital Status:   Children          Description of Support System: Supportive, Involved    Support Assessment: Lacks necessary supervision and assistance, Lacks adequate physical care    Current Resources:   Patient receiving home care services: No     Community Resources: Other (see comment) (Private pay person 1x per week for bathing)  Equipment currently used at home: walker, rolling, grab bar, toilet, grab bar, tub/shower, tub bench  Supplies currently used at home:      Employment/Financial:  Employment Status: retired         Financial Concerns:             Does the patient's insurance plan have a 3 day qualifying hospital stay waiver?  No    Lifestyle & Psychosocial Needs:  Social Determinants of Health     Food Insecurity: No Food Insecurity (9/26/2023)    Received from [facility]    Hunger Vital Sign     Worried About Running Out of Food in the Last Year: Never true     Ran Out of Food in the Last Year: Never true   Depression: Not on file   Housing Stability: Unknown (9/26/2023)    Received from [facility]    Housing Stability Vital Sign     Unable to Pay for Housing in the Last Year: No     Number of Times Moved in the Last Year: Not on file     Homeless in the Last Year: Not on file   Tobacco Use: Low Risk  (7/15/2024)    Received from [facility]    Patient History     Smoking Tobacco Use: Never     Smokeless Tobacco Use: Never     Passive Exposure: Not on file   Financial Resource Strain: Low Risk  (9/26/2023)    Received from [facility] [employer] [facility]    Overall Financial Resource Strain (CARDIA)     Difficulty of Paying Living Expenses: Not hard at all   Alcohol Use: Not At Risk (9/26/2023)    Received from [facility] [employer] [facility]    AUDIT-C     Frequency of Alcohol Consumption: Never     Average Number of Drinks: Patient does not drink     Frequency of Binge Drinking: Never   Transportation Needs: No Transportation Needs (9/26/2023)    Received from [facility] [employer] [facility]    PRAPARE - Transportation     Lack of Transportation (Medical): No     Lack of Transportation (Non-Medical): No   Physical Activity: Unknown (9/26/2023)    Received from [facility]    Exercise Vital Sign     Days of Exercise per Week: 7 days     Minutes of Exercise per Session: Not on file   Interpersonal Safety: Not At Risk (6/28/2024)    Received from [facility] [employer] [facility]    Intimate Partner Violence     Are you in a relationship  where you are physically hurt, threatened and/or made to feel afraid?: No   Stress: No Stress Concern Present (9/26/2023)    Received from [facility]    [place] [employer] - Occupational Stress Questionnaire     Feeling of Stress : Not at all   Social Connections: Moderately Integrated (9/26/2023)    Received from [facility]    Social Connection and Isolation Panel [NHANES]     Frequency of Communication with Friends and Family: More than three times a week     Frequency of Social Gatherings with Friends and Family: Three times a week     Attends Taoist Services: 1 to 4 times per year     Active Member of Clubs or Organizations: Yes     Attends Club or Organization Meetings: 1 to 4 times per year     Marital Status:    Health Literacy: Not on file       Functional Status:  Prior to admission patient needed assistance:   Dependent ADLs:: Ambulation-walker, Bathing, Dressing, Toileting  Dependent IADLs:: Cleaning, Cooking, Laundry, Shopping, Meal Preparation, Medication Management, Money Management, Transportation  Assesssment of Functional Status: Not at  functional baseline    Mental Health Status:  Mental Health Status: No Current Concerns       Chemical Dependency Status:  Chemical Dependency Status: No Current Concerns             Values/Beliefs:  Spiritual, Cultural Beliefs, Taoist Practices, Values that affect care:            Values/Beliefs Comment: Unknown    Additional Information:  Care Management consulted for discharge planning.      Writer visited with patient and her daughter, Sherrie.  Reviewed the information above.  Patient lives alone in her own home in [place].  Daughter, Sherrie, has been going to patient's home Monday-Saturday from 7a.m. to 5 p.m. to help care for patient for dressing, toileting, meals and all household tasks.  Patient reports that son, Walter, comes at 5pm to make her supper and helps with yard work and  her finances.  Daughter, Sherrie, reports that family can no longer continue providing this level of care in the home.  Discussed current recommendation for TCU placement. Discussed ROSA or LTC after TCU placement and they need to look at patient's finances to see what she can afford.      Writer provided patient and daughter with list of area Medicare certified TCU's, Medicare.gov website with star ratings.  Referrals sent to the following facilities per patient and daughter's request:    [facility] ([facility])  Pending - Request Sent N/A [address] [facility] [id number] [phone] --   [facility] ([facility])  Pending - Request Sent N/A [address] [facility] [address] [address] [phone] --   [facility] ([facility])  Pending - Request Sent N/A [address] [facility] [address] [phone] --   Current Capacity last updated by Zhou Guaman, RN on 9/1/2023  3:01 PM    Has secure memory unit            [facility] REFERRAL ([facility])  Pending - Request Sent N/A [address] [facility] [address] [phone] --     Also discussed that patient's insurance (Blue Cross Blue Shield Medicare Advantage) requires prior authorization for TCU placement.  Discussed process and that writer will get started on that today as time  allows.      Patient and daughter reported that sonWalter and his wife Claudia may be coming in today and may want to talk with writer as well.      Care Management will continue to follow and assist with discharge planning.      9710- Son, Claudia, here requesting to talk with . Writer went to the room and talked with patient, son- Claudia, and daughter- Sherrie.  Discussed the information above.  Claudia wanted to add [facility] in [place] to the referral list.  Writer will send this referral.  Claudia stated he is POA along with the other children, and he asked how to start inquiring  about Medical Assistance for the patient.  Writer discussed he should call [facility] and request to talk with financial services.  Discussed doing this sooner than later if they are thinking patient can't afford long term care placement.  Son stated he would look into this.  They had no further questions for today.  Sherrie is the primary  for patient at this time.      1632- Due to time constraints, writer unable to start prior authorization with insurance for TCU placement today.  Will plan for this tomorrow.      Macrina Copeland, ACE  [facility]   [phone]

## 2024-07-16 NOTE — PROGRESS NOTES
S-(situation): Patient arrives to room 252 via cart from ED    B-(background): Patient having frequent falls with fractures    A-(assessment): A&Ox4, VSS on room air. Pain medication administered in ED, currently denies pain, bed rest with pure wick in place.    R-(recommendations): Orders reviewed with patient. Will monitor patient per MD orders.     Inpatient nursing criteria listed below were met:    Health care directives status obtained and documented: Yes  VTE ordered/documented: Yes  Skin issues/needs documented:Yes  Isolation addressed and Signage used: NA  Fall Prevention: Care plan updated Yes Education given and documented Yes  Care Plan initiated and Co-Morbidities added: Yes  Education Assessment documented:Yes  Admission Education Documented: Yes  If present CAUTI/CLABI Education done: NA  New medication patient education completed and documented (Possible Side Effects of Common Medications handout):  N/A  Allergies Reviewed: Yes  Admission Medication Reconciliation completed: Yes  Home medications if not able to send immediately home with family stored here: NA  Reminder note placed in discharge instructions regarding home meds: NA  Individualized care needs/preferences addressed and charted: Yes  Provider Notified that patient has arrived to the unit: Yes

## 2024-07-16 NOTE — PLAN OF CARE
Goal Outcome Evaluation:      Plan of Care Reviewed With: patient, child    Overall Patient Progress: no changeOverall Patient Progress: no change    Outcome Evaluation: Pt A to self only but pleasant. Orientation seems to be flucutating today. VSS, afebrile, on room air. Orthos done this morning, see flowsheets. Denies any pain. Pure wick removed this morning, incontinent of urine. Purewick back in place when pt went back to bed. Up in chair today. Up with assist of 1 with walker/GB. Fair appetite today. LR continues to run at 100 mL/hr. Son and daughter at bedside today.

## 2024-07-16 NOTE — PLAN OF CARE
Goal Outcome Evaluation:      Plan of Care Reviewed With: patient    Overall Patient Progress: no changeOverall Patient Progress: no change        Outcome Evaluation: Pt A&Ox4, VSS on room air. denies pain, slept well between cares. Pure wick in place PT to see pt today. Pt had frequent falls at home and will need assessment of safe discharge plan.

## 2024-07-16 NOTE — PROGRESS NOTES
07/16/24 0800   Appointment Info   Signing Clinician's Name / Credentials (PT) Flori Holloway PT, DPT, ATC, LAT   Rehab Comments (PT) PT eval and treat sacral fractures,. Activity order: WBAT per Ortho recommendations, up with assist   Quick Adds   Quick Adds Certification       Present no   Living Environment   People in Home alone   Current Living Arrangements house   Home Accessibility stairs to enter home;stairs within home   Number of Stairs, Main Entrance 1   Stair Railings, Main Entrance none   Number of Stairs, Within Home, Primary two   Stair Railings, Within Home, Primary none   Transportation Anticipated family or friend will provide   Living Environment Comments flat bed, recently sleeping in recliner. step over tub. daughter reports being in the home in the am until afternoon and then a son comes over with a dinner meal for patient. Ambulation recently only short distances in home with assist   Self-Care   Usual Activity Tolerance fair   Current Activity Tolerance fair   Regular Exercise No   Equipment Currently Used at Home walker, rolling;grab bar, toilet;grab bar, tub/shower;tub bench   Fall history within last six months yes   Number of times patient has fallen within last six months 2   Activity/Exercise/Self-Care Comment assistance for bathing, errands, home management. daughter stays with patient daily. alone at night. family manages medications   General Information   Onset of Illness/Injury or Date of Surgery 07/15/24   Referring Physician Josefa Mares MD   Patient/Family Therapy Goals Statement (PT) family seeking placement, they can no longer provide patient's level of care needs   Pertinent History of Current Problem (include personal factors and/or comorbidities that impact the POC) Patient is a 84 year old female, admitted due to back pain following fall in the setting of UTI with sacral insufficiency fractures and possible pubic ramus fracture, impaired  mobility and inability to care for self. Patient with a previous medical history of HTN, glaucoma, anemia.   Existing Precautions/Restrictions fall   Weight-Bearing Status - LUE full weight-bearing   Weight-Bearing Status - RUE full weight-bearing   Weight-Bearing Status - LLE weight-bearing as tolerated   Weight-Bearing Status - RLE weight-bearing as tolerated   General Observations patient lying in bed, pleasant. daughter arrived midsession able to provide baseline function   Cognition   Affect/Mental Status (Cognition) confused   Orientation Status (Cognition) disoriented to;place;situation;time  (states last name, cues for .)   Follows Commands (Cognition) WFL   Safety Deficit (Cognition) awareness of need for assistance;insight into deficits/self-awareness   Cognitive Status Comments significant cognitive impairments observed during PT evaluation, patient would benefit from formal assessment by OT when services are available. delayed processing, unable to recall previous cues for training functionally. Able to complete tasks cued with cues for repititions and continuation.   Pain Assessment   Patient Currently in Pain No   Posture    Posture Forward head position;Protracted shoulders   Range of Motion (ROM)   Range of Motion ROM is WFL   Strength (Manual Muscle Testing)   Strength (Manual Muscle Testing) Able to perform L SLR;Deficits observed during functional mobility   Strength Comments right limited SLR flexion.   Bed Mobility   Bed Mobility supine-sit;rolling left   Rolling Left Wirt (Bed Mobility) independent   Supine-Sit Wirt (Bed Mobility) moderate assist (50% patient effort)   Impairments Contributing to Impaired Bed Mobility pain;decreased strength;impaired coordination   Transfers   Transfers sit-stand transfer;bed-chair transfer   Transfer Safety Concerns Noted losing balance backward;decreased sequencing ability;decreased step length   Impairments Contributing to Impaired  Transfers impaired balance;decreased strength   Bed-Chair Transfer   Assistive Device (Bed-Chair Transfers) walker, front-wheeled   Bed-Chair [place] (Transfers) minimum assist (75% patient effort)   Sit-Stand Transfer   Sit-Stand [place] (Transfers) minimum assist (75% patient effort)   Assistive Device (Sit-Stand Transfers) walker, front-wheeled   Gait/Stairs (Locomotion)   Comment, (Gait/Stairs) unable to safely progress due to weakness, impaired balance and poor sequencing   Balance   Balance Comments poor dynamic sitting balance, falling back x 1 in sitting and standing posterior weight shift requiring assistance to shift weight towards toes.   Clinical Impression   Criteria for Skilled Therapeutic Intervention Yes, treatment indicated   PT Diagnosis (PT) impaired mobility, muscle weakness, impaired balance   Influenced by the following impairments medical status, falls, fractures   Functional limitations due to impairments impaired insight to safety needs, use of walker, assistance for transfers and mobilization   Clinical Presentation (PT Evaluation Complexity) evolving   Clinical Presentation Rationale clinical judgement, medical status, pelvic fractures   Clinical Decision Making (Complexity) moderate complexity   Planned Therapy Interventions (PT) balance training;bed mobility training;gait training;home exercise program;ROM (range of motion);strengthening;patient/family education;transfer training;progressive activity/exercise;home program guidelines   Risk & Benefits of therapy have been explained evaluation/treatment results reviewed;participants voiced agreement with care plan;participants included;patient;daughter   Clinical Impression Comments Patient presents with impaired strength and functional mobility. Patient requires assistance for safe transfer as well as bed mobility. Patient required 24/7 assistance due to history of falls and impaired cognition. At this time as patient is below  her functional baseline she would benefit from TCU placement. Further assessment of patient's cogntion and ADL capacity will be determined when OT services are available at this site. Anticipate patient will need 24/7 assistance going forward, which is not available in the current living environment.   PT Total Evaluation Time   PT Eval, Moderate Complexity Minutes (46583) 25   Therapy Certification   Start of care date 07/16/24   Certification date from 07/16/24   Certification date to 07/17/24   Medical Diagnosis frequent falls   Physical Therapy Goals   PT Frequency 5x/week   PT Predicted Duration/Target Date for Goal Attainment 07/17/24   PT Goals Bed Mobility;Transfers;Gait;Stairs   PT: Bed Mobility Independent;Supine to/from sit   PT: Transfers Modified independent;Sit to/from stand;Assistive device   PT: Gait Modified independent;Rolling walker;50 feet   PT: Stairs Supervision/stand-by assist;2 stairs;Assistive device   Interventions   Interventions Quick Adds Therapeutic Activity   Therapeutic Activity   Therapeutic Activities: dynamic activities to improve functional performance Minutes (98249) 10   Symptoms Noted During/After Treatment Fatigue   Treatment Detail/Skilled Intervention PT: patient lying in bed upon PT arrival, completed orthostatic BP measurement with nursing. PT acquired and set up recliner in room to facilitate time out of bed. Patient s daughter present and PT spent significant time throughout the session discussing TCU and discharge plan from there, encouraged LTC placement discussion and including the patient. Educated patient in time out of bed, rehab, strengthening and safety concerns. Patient set up in recliner, needs in reach and no questions for PT, alarm on.   PT Discharge Planning   PT Plan 1/5 Tues. bed mobility, ambulate, seated and supine HEP   PT Discharge Recommendation (DC Rec) Transitional Care Facility;home with assist;home with home care physical therapy   PT Rationale  for DC Rec Patient from home alone, stairs to enter and within. Family currently providing support in the home during the day hours, but cannot continue to provide this significant support. Patient presents with impaired strength and functional mobility. Patient requires assistance for safe transfer as well as bed mobility. Patient required 24/7 assistance due to history of falls and impaired cognition. At this time as patient is below her functional baseline she would benefit from TCU placement. Further assessment of patient's cogntion and ADL capacity will be determined when OT services are available at this site. Anticipate patient will need 24/7 assistance going forward, which is not available in the current living environment.   PT Brief overview of current status MOD assist supine to sitting. MIN assist sit to/from stand with walker. MIN assist bed to chair transfer with walker.   Total Session Time   Timed Code Treatment Minutes 10   Total Session Time (sum of timed and untimed services) 35     [facility]  OUTPATIENT PHYSICAL THERAPY EVALUATION  PLAN OF TREATMENT FOR OUTPATIENT REHABILITATION  (COMPLETE FOR INITIAL CLAIMS ONLY)  Patient's Last Name, First Name, M.I.  YOB: 1940  Selena Lyons                        Provider's Name  [facility] Medical Record No.  [id number]                             Onset Date:  07/15/24   Start of Care Date:  07/16/24   Type:     _X_PT   ___OT   ___SLP Medical Diagnosis:  frequent falls              PT Diagnosis:  impaired mobility, muscle weakness, impaired balance Visits from SOC:  1     See note for plan of treatment, functional goals and certification details    I CERTIFY THE NEED FOR THESE SERVICES FURNISHED UNDER        THIS PLAN OF TREATMENT AND WHILE UNDER MY CARE     (Physician co-signature of this document indicates review and certification of the therapy plan).              Thank  you for your referral.  Flori Holloway, PT, DPT, ATC, LAT    [facility]  O: [phone]  E: [email or web address]

## 2024-07-16 NOTE — PROGRESS NOTES
[facility]    Medicine Progress Note - Hospitalist Service    Date of Admission:  7/15/2024    Assessment & Plan   This is an 84-year-old hypertension, glaucoma, frequent falls, referred to the ED by PCP after imaging revealed sacral and pubic ramus fracture due to falls.  She was also recently diagnosed with UTI, which was being treated with ciprofloxacin.  Orthopedic surgery consulted in the ED, fractures nonoperative, symptom management.  Hip/pelvic XR showed no acute process.  MRI right hip with acute bilateral sacral insufficiency fracture, left hip not well-evaluated, but findings regarding left superior inferior pubic ramus and L5 transverse process could reflect contusions or undisplaced fractures.  MRI brain without acute intracranial process.  Chronic small vessel ischemic changes.  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  Patient lives alone    Acute bilateral sacral insufficiency fractures  Question of left superior inferior pubic ramus and L5 transverse process fractures  Recurrent falls  -Orthopedic surgery consulted, nonoperative management  -Analgesics as needed for pain control  -Orthostatic vital signs negative  -PT consulted, recommending TCU    Acute kidney injury, possible CKD, suspect prerenal  -Baseline creatinine unknown  -Creatinine 2.24 => 1.84  -Hold ACE-I  -IV fluids, monitor volume status  -Monitor I&O's    Normocytic anemia,?  Chronic  -No apparent bleeding  -Check iron studies  -Monitor hemoglobin    Hypertension  -Hold lisinopril due to BANDAR  -As needed hydralazine    Hypothyroidism  -Check TSH  -Continue levothyroxine    Glaucoma  -Continue home drops            Diet: Combination Diet Regular Diet Adult; Safe Tray - with utensils    DVT Prophylaxis: Enoxaparin (Lovenox) SQ  Castorena Catheter: Not present  Lines: None     Cardiac Monitoring: None  Code  Status:  Okay for intubation, no CPR    Clinically Significant Risk Factors Present on Admission                  # Hypertension: Home medication list includes antihypertensive(s)    # Anemia: based on hgb <11                      Disposition Plan     Medically Ready for Discharge: Anticipated Tomorrow             Ceci Noel MD  Hospitalist Service  [facility]  Securely message with [employer] (more info)  Text page via [employer] Paging/Directory   ______________________________________________________________________    Interval History   No acute issues overnight.  Feeling a little better today.  No sacral or pelvic pain at rest.  Tolerating p.o.      Physical Exam   Vital Signs: Temp: 98.1  F (36.7  C) Temp src: Oral BP: (!) 157/58 Pulse: 80   Resp: 16 SpO2: 99 % O2 Device: None (Room air)    Weight: 131 lbs 9.83 oz    Constitutional: Awake  Eyes: PERRL, normal conjunctiva  ENT: Trachea midline, no adenopathy  Respiratory: Breath sounds present bilaterally, no wheezing  Cardiovascular: RRR, S1S2  Gastrointestinal: Soft, bowel sounds present.  1 cm round, discrete, mobile periumbilical nodule, nontender  Musculoskeletal: No tenderness.  Bilateral lower extremity edema  Skin: Warm and dry.  Small ecchymoses right medial ankle area  Neurological: Alert, responding appropriately to questions, no focal deficits  Psychiatric: Cooperative      Medical Decision Making       51 MINUTES SPENT BY ME on the date of service doing chart review, history, exam, documentation & further activities per the note.      Data     I have personally reviewed the following data over the past 24 hrs:    7.7  \   9.1 (L)   / 193     137 107 53.0 (H) /  100 (H)   4.3 20 (L) 1.84 (H) \     TSH: 5.85 (H) T4: 1.11 A1C: N/A     Ferritin:  610 (H) % Retic:  N/A LDH:  N/A       Imaging results reviewed over the past 24 hrs:   No results found for this or any previous visit (from the past 24 hour(s)).

## 2024-07-17 ENCOUNTER — APPOINTMENT (OUTPATIENT)
Dept: PHYSICAL THERAPY | Facility: CLINIC | Age: 84
End: 2024-07-17
Payer: COMMERCIAL

## 2024-07-17 ENCOUNTER — APPOINTMENT (OUTPATIENT)
Dept: OCCUPATIONAL THERAPY | Facility: CLINIC | Age: 84
End: 2024-07-17
Payer: COMMERCIAL

## 2024-07-17 VITALS
HEART RATE: 83 BPM | WEIGHT: 131.61 LBS | OXYGEN SATURATION: 98 % | BODY MASS INDEX: 21.15 KG/M2 | DIASTOLIC BLOOD PRESSURE: 62 MMHG | SYSTOLIC BLOOD PRESSURE: 129 MMHG | RESPIRATION RATE: 18 BRPM | HEIGHT: 66 IN | TEMPERATURE: 97.9 F

## 2024-07-17 LAB
ANION GAP SERPL CALCULATED.3IONS-SCNC: 8 MMOL/L (ref 7–15)
BUN SERPL-MCNC: 35.8 MG/DL (ref 8–23)
CALCIUM SERPL-MCNC: 8.6 MG/DL (ref 8.8–10.4)
CHLORIDE SERPL-SCNC: 105 MMOL/L (ref 98–107)
CREAT SERPL-MCNC: 1.49 MG/DL (ref 0.51–0.95)
EGFRCR SERPLBLD CKD-EPI 2021: 34 ML/MIN/1.73M2
ERYTHROCYTE [DISTWIDTH] IN BLOOD BY AUTOMATED COUNT: 16.2 % (ref 10–15)
GLUCOSE SERPL-MCNC: 97 MG/DL (ref 70–99)
HCO3 SERPL-SCNC: 22 MMOL/L (ref 22–29)
HCT VFR BLD AUTO: 29.3 % (ref 35–47)
HGB BLD-MCNC: 9.2 G/DL (ref 11.7–15.7)
MCH RBC QN AUTO: 28.6 PG (ref 26.5–33)
MCHC RBC AUTO-ENTMCNC: 31.4 G/DL (ref 31.5–36.5)
MCV RBC AUTO: 91 FL (ref 78–100)
PLATELET # BLD AUTO: 179 10E3/UL (ref 150–450)
POTASSIUM SERPL-SCNC: 4.4 MMOL/L (ref 3.4–5.3)
RBC # BLD AUTO: 3.22 10E6/UL (ref 3.8–5.2)
SODIUM SERPL-SCNC: 135 MMOL/L (ref 135–145)
WBC # BLD AUTO: 7.7 10E3/UL (ref 4–11)

## 2024-07-17 PROCEDURE — 97535 SELF CARE MNGMENT TRAINING: CPT | Mod: GO | Performed by: SPECIALIST/TECHNOLOGIST

## 2024-07-17 PROCEDURE — 99207 PR NO BILLABLE SERVICE THIS VISIT: CPT | Performed by: INTERNAL MEDICINE

## 2024-07-17 PROCEDURE — 97110 THERAPEUTIC EXERCISES: CPT | Mod: GP | Performed by: PHYSICAL THERAPIST

## 2024-07-17 PROCEDURE — 250N000013 HC RX MED GY IP 250 OP 250 PS 637: Performed by: INTERNAL MEDICINE

## 2024-07-17 PROCEDURE — 97165 OT EVAL LOW COMPLEX 30 MIN: CPT | Mod: GO | Performed by: SPECIALIST/TECHNOLOGIST

## 2024-07-17 PROCEDURE — G0378 HOSPITAL OBSERVATION PER HR: HCPCS

## 2024-07-17 PROCEDURE — 99239 HOSP IP/OBS DSCHRG MGMT >30: CPT | Performed by: INTERNAL MEDICINE

## 2024-07-17 PROCEDURE — 80048 BASIC METABOLIC PNL TOTAL CA: CPT | Performed by: INTERNAL MEDICINE

## 2024-07-17 PROCEDURE — 85027 COMPLETE CBC AUTOMATED: CPT | Performed by: INTERNAL MEDICINE

## 2024-07-17 PROCEDURE — 36415 COLL VENOUS BLD VENIPUNCTURE: CPT | Performed by: INTERNAL MEDICINE

## 2024-07-17 PROCEDURE — 97530 THERAPEUTIC ACTIVITIES: CPT | Mod: GP | Performed by: PHYSICAL THERAPIST

## 2024-07-17 RX ADMIN — LEVOTHYROXINE SODIUM 88 MCG: 0.09 TABLET ORAL at 08:38

## 2024-07-17 RX ADMIN — TIMOLOL MALEATE 1 DROP: 5 SOLUTION OPHTHALMIC at 08:38

## 2024-07-17 ASSESSMENT — ACTIVITIES OF DAILY LIVING (ADL)
ADLS_ACUITY_SCORE: 43
ADLS_ACUITY_SCORE: 44
ADLS_ACUITY_SCORE: 43

## 2024-07-17 NOTE — PLAN OF CARE
Goal Outcome Evaluation:      Plan of Care Reviewed With: patient    Overall Patient Progress: no changeOverall Patient Progress: no change    Outcome Evaluation: Pt A&O to self, pleasant and cooperative. Sat in chair most of shift, currently resting in bed. No complaints of pain or nausea. IVF stopped per MAR. External catheter in place with good urine output. Pending placement.

## 2024-07-17 NOTE — PROGRESS NOTES
[facility]    Medicine Progress Note - Hospitalist Service    Date of Admission:  7/15/2024    Assessment & Plan   This is an 84-year-old hypertension, glaucoma, frequent falls, referred to the ED by PCP after imaging revealed sacral and pubic ramus fracture due to falls.  She was also recently diagnosed with UTI, which was being treated with ciprofloxacin.  Orthopedic surgery consulted in the ED, fractures nonoperative, symptom management.  Hip/pelvic XR showed no acute process.  MRI right hip with acute bilateral sacral insufficiency fracture, left hip not well-evaluated, but findings regarding left superior inferior pubic ramus and L5 transverse process could reflect contusions or undisplaced fractures.  MRI brain without acute intracranial process.  Chronic small vessel ischemic changes.  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  Patient lives alone.  Renal function progressively improved with IV fluids.  Pain was adequately controlled with Tylenol.  PT consulted, recommended TCU.  Patient is discharged pending placement    Acute bilateral sacral insufficiency fractures  Question of left superior inferior pubic ramus and L5 transverse process fractures  Recurrent falls  -Orthopedic surgery consulted, nonoperative management  -Analgesics as needed for pain control  -Orthostatic vital signs negative  -PT consulted, recommending TCU    Acute kidney injury, possible CKD, suspect prerenal  -Baseline creatinine unknown  -Creatinine 2.24 => 1.84  -Hold ACE-I  -IV fluids, monitor volume status  -Monitor I&O's    Normocytic anemia,?  Chronic  -No apparent bleeding  -Check iron studies  -Monitor hemoglobin    Hypertension  -Hold lisinopril due to BANDAR  -As needed hydralazine    Hypothyroidism  -Check TSH  -Continue levothyroxine    Glaucoma  -Continue home drops            Diet: Combination  Diet Regular Diet Adult  Diet    DVT Prophylaxis: Pneumatic Compression Devices  Castorena Catheter: Not present  Lines: None     Cardiac Monitoring: None  Code Status:  Intubation, no CPR    Clinically Significant Risk Factors Present on Admission                  # Hypertension: Home medication list includes antihypertensive(s)    # Anemia: based on hgb <11                      Disposition Plan     Medically Ready for Discharge: Anticipated Today             Ceci Noel MD  Hospitalist Service  [facility]  Securely message with [employer] (more info)  Text page via [employer] Paging/Directory   ______________________________________________________________________    Interval History   No acute overnight events.  Patient resting comfortably.  Mildly confused, but responding appropriately.  Denies pain or nausea.  Tolerating diet.    Patient's daughter at bedside, updated    Physical Exam   Vital Signs: Temp: 97.9  F (36.6  C) Temp src: Oral BP: 129/62 Pulse: 83   Resp: 18 SpO2: 98 % O2 Device: None (Room air)    Weight: 131 lbs 9.83 oz    Constitutional: Awake  Eyes: PERRL, normal conjunctiva  ENT: Trachea midline, no adenopathy  Respiratory: Breath sounds present bilaterally, no wheezing  Cardiovascular: RRR, S1S2  Gastrointestinal: Soft, bowel sounds present.  1 cm round, discrete, mobile periumbilical nodule, nontender  Musculoskeletal: No tenderness.  Bilateral lower extremity edema  Skin: Warm and dry.  Small ecchymoses right medial ankle area  Neurological: Alert, responding appropriately to questions, no focal deficits  Psychiatric: Cooperative    Medical Decision Making       51 MINUTES SPENT BY ME on the date of service doing chart review, history, exam, documentation & further activities per the note.      Data     I have personally reviewed the following data over the past 24 hrs:    7.7  \   9.2 (L)   / 179     135 105 35.8 (H) /  97   4.4 22 1.49 (H) \       Imaging results  reviewed over the past 24 hrs:   No results found for this or any previous visit (from the past 24 hour(s)).

## 2024-07-17 NOTE — PROGRESS NOTES
Care Management  Jose (MARCE) called for Auth with Ronnie. MARCE spoke with Waldo LONGO And was given the case number [id number] then was transfer to SAHLYN Charles who was able to take the information needed and gave the Auth for the pt to admit to TCU as needed with dates of 7/17-7/22 with the Auth of [id number]. MARCE updated CM with the Auth as needed.

## 2024-07-17 NOTE — PROGRESS NOTES
[facility]    Medicine Progress Note - Hospitalist Service    Date of Admission:  7/15/2024    Assessment & Plan   This is an 84-year-old hypertension, glaucoma, frequent falls, referred to the ED by PCP after imaging revealed sacral and pubic ramus fracture due to falls.  She was also recently diagnosed with UTI, which was being treated with ciprofloxacin.  Orthopedic surgery consulted in the ED, fractures nonoperative, symptom management.  Hip/pelvic XR showed no acute process.  MRI right hip with acute bilateral sacral insufficiency fracture, left hip not well-evaluated, but findings regarding left superior inferior pubic ramus and L5 transverse process could reflect contusions or undisplaced fractures.  MRI brain without acute intracranial process.  Chronic small vessel ischemic changes.  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  Patient lives alone    Acute bilateral sacral insufficiency fractures  Question of left superior inferior pubic ramus and L5 transverse process fractures  Recurrent falls  -Orthopedic surgery consulted, nonoperative management  -Analgesics as needed for pain control  -Orthostatic vital signs negative  -PT consulted, recommending TCU    Acute kidney injury, possible CKD, suspect prerenal  -Baseline creatinine unknown  -Creatinine 2.24 => 1.84  -Hold ACE-I  -IV fluids, monitor volume status  -Monitor I&O's    Normocytic anemia,?  Chronic  -No apparent bleeding  -Check iron studies  -Monitor hemoglobin    Hypertension  -Hold lisinopril due to BANDAR  -As needed hydralazine    Hypothyroidism  -Check TSH  -Continue levothyroxine    Glaucoma  -Continue home drops            Diet: Combination Diet Regular Diet Adult  Diet    DVT Prophylaxis: Pneumatic Compression Devices  Castorena Catheter: Not present  Lines: None     Cardiac Monitoring: None  Code Status:  Intubation,  no CPR    Clinically Significant Risk Factors Present on Admission                  # Hypertension: Home medication list includes antihypertensive(s)    # Anemia: based on hgb <11                      Disposition Plan     Medically Ready for Discharge: Anticipated Today             Ceci Noel MD  Hospitalist Service  [facility]  Securely message with [employer] (more info)  Text page via [employer] Paging/Directory   ______________________________________________________________________    Interval History   No overnight issues.  Resting comfortably.  Mildly confused, but responding appropriately to questions.  Denies pain.  Tolerating diet    Physical Exam   Vital Signs: Temp: 97.9  F (36.6  C) Temp src: Oral BP: 129/62 Pulse: 83   Resp: 18 SpO2: 98 % O2 Device: None (Room air)    Weight: 131 lbs 9.83 oz    Constitutional: Awake  Eyes: PERRL, normal conjunctiva  ENT: Trachea midline, no adenopathy  Respiratory: Breath sounds present bilaterally, no wheezing  Cardiovascular: RRR, S1S2  Gastrointestinal: Soft, bowel sounds present.  1 cm round, discrete, mobile periumbilical nodule, nontender  Musculoskeletal: No tenderness.  Bilateral lower extremity edema  Skin: Warm and dry.  Small ecchymoses right medial ankle area  Neurological: Alert, responding appropriately to questions, no focal deficits  Psychiatric: Cooperative    Medical Decision Making       51 MINUTES SPENT BY ME on the date of service doing chart review, history, exam, documentation & further activities per the note.      Data     I have personally reviewed the following data over the past 24 hrs:    7.7  \   9.2 (L)   / 179     135 105 35.8 (H) /  97   4.4 22 1.49 (H) \       Imaging results reviewed over the past 24 hrs:   No results found for this or any previous visit (from the past 24 hour(s)).

## 2024-07-17 NOTE — PROGRESS NOTES
Care Management Follow Up    Length of Stay (days): 1    Expected Discharge Date: 07/17/2024     Concerns to be Addressed: discharge planning       Patient plan of care discussed at interdisciplinary rounds: Yes    Anticipated Discharge Disposition: Transitional Care     Anticipated Discharge Services: None    Anticipated Discharge DME: None    Patient/family educated on Medicare website which has current facility and service quality ratings: yes    Education Provided on the Discharge Plan: Yes    Patient/Family in Agreement with the Plan: yes    Referrals Placed by CM/SW: Post Acute Facilities    Private pay costs discussed:  Private pay costs of long term care or ROSA placement after TCU placement.      Additional Information:  Per physician, patient is medically ready to discharge to a TCU today.  Referrals are still pending.  [facility] (patient's first choice) does not have a bed available.  Writer awaiting calls back from [facility] and [facility].  No communication back from [facility].      SAVITA was able to work on prior authorization with Kenny through patient's [employer] Medicare Advantage plan.  Kenny did approve prior authorization for TCU placement (see CM previous note from today).      Care Management will continue to work on finding TCU placement for this patient.  A barrier to this has been concern by the TCU's assessing that patient will need LTC or ROSA after TCU placement and location for this has not been found.      1342- Writer has visited with patient and her daughter, Sherrie.  Discussed that [facility] has no beds. Discussed [facility] has not called back, even though [facility] team sent communication to them. [facility] if she can arrive before 4pm.      Discussed recommendation by hospital PT services for agency wheelchair transport due to how limited her movements are at  this time.  Discussed approximate cost of possibly $80.00 or more for the transport.  Plan is for Walter laws, to pay via credit card over the phone.     Writer spoke with [facility].  They have a transport open at 1515 today.  Writer called Walter laws. Discussed the information above and actual cost of [facility] to be $146.00.  Walter plans to pay [facility] via credit card over the phone.      Writer called Caitlin back at [facility] and stated 1515 transport time.      ACE Flower  [employer] [facility]   [phone]

## 2024-07-17 NOTE — PROGRESS NOTES
[facility]  OUTPATIENT OCCUPATIONAL THERAPY  EVALUATION  PLAN OF TREATMENT FOR OUTPATIENT REHABILITATION  (COMPLETE FOR INITIAL CLAIMS ONLY)  Patient's Last Name, First Name, M.I.  YOB: 1940  Selena Lyons                          Provider's Name  [facility] Medical Record No.  [id number]                             Onset Date:  07/15/24   Start of Care Date:   7/17/24  Through: 7/23/24   Type:     ___PT   _X_OT   ___SLP Medical Diagnosis:             Falls, sacral insufficiency, pubic rami fracture          OT Diagnosis:  Impaired ADL/MRADL engagement Visits from SOC:  1     See note for plan of treatment, functional goals and certification details    I CERTIFY THE NEED FOR THESE SERVICES FURNISHED UNDER        THIS PLAN OF TREATMENT AND WHILE UNDER MY CARE     (Physician co-signature of this document indicates review and certification of the therapy plan).                           07/17/24 1100   Appointment Info   Signing Clinician's Name / Credentials (OT) Gayatri Britton OTR/L   Living Environment   People in Home alone   Current Living Arrangements house   Home Accessibility stairs to enter home;stairs within home   Number of Stairs, Main Entrance 1   Stair Railings, Main Entrance none   Number of Stairs, Within Home, Primary two   Stair Railings, Within Home, Primary none   Transportation Anticipated family or friend will provide   Living Environment Comments Once inside access to main level living. Tub shower with shower chair, toilet grab bars. FWW. Daughter provides daily assist with ADLS all days but Friday, Morning through afternoon, son comes for dinner. Patient alone over night   Self-Care   Usual Activity Tolerance fair   Current Activity Tolerance fair   Regular Exercise No   Equipment Currently Used at Home walker, rolling;grab bar, toilet;grab bar, tub/shower;tub bench   Fall history within last six months yes   Number  "of times patient has fallen within last six months   (per daughter 5-6)   Activity/Exercise/Self-Care Comment Recently requiring increased assist with bathing, dressing, toileting in all components of tasks including pericares.   Instrumental Activities of Daily Living (IADL)   IADL Comments Family provides meals, completes home mangement tasks, manages finances and medications, provides transportation   General Information   Onset of Illness/Injury or Date of Surgery 07/15/24   Referring Physician Dr. Mares   Patient/Family Therapy Goal Statement (OT) Agreeable to TCU   Additional Occupational Profile Info/Pertinent History of Current Problem per ospitalist progress note, \"This is an 84-year-old hypertension, glaucoma, frequent falls, referred to the ED by PCP after imaging revealed sacral and pubic ramus fracture due to falls.  She was also recently diagnosed with UTI, which was being treated with ciprofloxacin.  Orthopedic surgery consulted in the ED, fractures nonoperative, symptom management.  Hip/pelvic XR showed no acute process.  MRI right hip with acute bilateral sacral insufficiency fracture, left hip not well-evaluated, but findings regarding left superior inferior pubic ramus and L5 transverse process could reflect contusions or undisplaced fractures.  MRI brain without acute intracranial process.  Chronic small vessel ischemic changes.  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  Patient lives alone.  Renal function progressively improved with IV fluids.  Pain was adequately controlled with Tylenol.  PT consulted, recommended TCU.  Patient is discharged pending placement\"   Existing Precautions/Restrictions weight bearing   Limitations/Impairments safety/cognitive   Left Lower Extremity (Weight-bearing Status) weight-bearing as tolerated (WBAT)   Right Lower Extremity (Weight-bearing Status) weight-bearing " as tolerated (WBAT)   General Observations and Info OT Orders for eval and treat;  sacral fracture, WBAT   Cognitive Status Examination   Orientation Status person   Affect/Mental Status (Cognitive) confused   Follows Commands follows one-step commands;50-74% accuracy   Memory Deficit short-term memory;working memory;recall, recent events   Cognitive Status Comments Patient requiring cueing for initiation, ongoing engagement within SLUMS. She is pleasent but with significant cognitive deficits, notable short term and working memory deficits. Daughter reports patient is at baseline cognition. See SLUMS note for further detials   Visual Perception   Visual Impairment/Limitations WFL  (Wears glasses for reading, able to read clock on wall. Glassess not with her at hospital)   Pain Assessment   Patient Currently in Pain No  (while seated at rest)   Range of Motion Comprehensive   General Range of Motion bilateral upper extremity ROM WFL   Strength Comprehensive (MMT)   Comment, General Manual Muscle Testing (MMT) Assessment L shoulder flexion 4-/5, R 3+/5, otherwise bilateral elbow flexion/extension 4/5, equal  strength   Coordination   Coordination Comments No overt deficit identified this date   Bed Mobility   Comment (Bed Mobility) Per PT min-mod A   Transfers   Transfer Comments Per PT, Ax1 for mobility with limited tolerance   Balance   Balance Comments Defer to PT   Activities of Daily Living   BADL Assessment/Intervention upper body dressing;lower body dressing;grooming;toileting   Upper Body Dressing Assessment/Training   Comment, (Upper Body Dressing) Assist at recent baseline, anticipated to be Ax1   Lower Body Dressing Assessment/Training   Comment, (Lower Body Dressing) Assist at recent baseline, anticipated to be Ax1 currently. Daughter providing assist with all aspects of dressing at home recently.   Grooming Assessment/Training   Comment, (Grooming) Assist at recent baseline, anticipated to be Ax1  with cues for cognitive engagement   Toileting   Comment, (Toileting) Assist at recent baseline, anticipated to be Ax1 currently. Daughter providing assist with all aspects of dressing at home recently.   Clinical Impression   Criteria for Skilled Therapeutic Interventions Met (OT) Yes, treatment indicated   OT Diagnosis Impaired ADL/MRADL engagement   Influenced by the following impairments Recent fall, sacral fracture, baseline cognitive deficits   OT Problem List-Impairments impacting ADL problems related to;activity tolerance impaired;balance;cognition;mobility;strength;post-surgical precautions   Assessment of Occupational Performance 1-3 Performance Deficits   Identified Performance Deficits Participation in self care routine   Planned Therapy Interventions (OT) ADL retraining;cognition;strengthening;home program guidelines;progressive activity/exercise   Intervention Comments SLUMS, progress endurance for ADL sequence as able   Clinical Decision Making Complexity (OT) problem focused assessment/low complexity   Risk & Benefits of therapy have been explained evaluation/treatment results reviewed;care plan/treatment goals reviewed;participants included;patient;daughter   Clinical Impression Comments Patient presents with below baseline mobility, recent increased need in ADLs following fall. Will benefit from skilled OT for assessment of cognition, progression of endurance for ADL engagement, addressing below stated goals   OT Total Evaluation Time   OT Eval, Low Complexity Minutes (12166) 15   OT Goals   Therapy Frequency (OT) 4 times/week   OT Predicted Duration/Target Date for Goal Attainment 07/23/24   OT Goals Upper Body Dressing;Lower Body Dressing;Hygiene/Grooming;Toilet Transfer/Toileting;Cognition   OT: Hygiene/Grooming supervision/stand-by assist;while standing   OT: Upper Body Dressing Minimal assist;Supervision/stand-by assist   OT: Lower Body Dressing Minimal assist   OT: Toilet Transfer/Toileting  Minimal assist   OT: Cognitive Patient/caregiver will verbalize understanding of cognitive assessment results/recommendations as needed for safe discharge planning   Interventions   Interventions Quick Adds Self-Care/Home Management   Self-Care/Home Management   Self-Care/Home Mgmt/ADL, Compensatory, Meal Prep Minutes (59729) 20   Treatment Detail/Skilled Intervention Patient seated in recliner having just finished PT. Reports on fatigue. Limited engagement with eval questions, daughter present to supplement history. Agreeable to [facility] for discharge planning and recommendations. Daughter feels patient is at baseline cogniiton. SLUMS 3/30 with cues for initiation and encouragement required for engagement, limited processing speed and ability noted. Discussion with patient at daughter with recommendation for 24/7 assist including ongoing cueing for continued engagement in ADL seqeunce and to ensure safety within self cares and related mobility. Recommending TCU for strengthening with good single step directive following. Patient declines further mobility and ADLs, reports having completed with nursing this AM. Daughter understanding of recommendations. Patient remains in recliner with chair alarm activated, able to identify nurse call button with call light in reach. No further needs or questions identified at this time   OT Discharge Planning   OT Plan 1/4 WED: Endurance   OT Discharge Recommendation (DC Rec) Transitional Care Facility   OT Rationale for DC Rec Here from home alone with daily support from daughter, alone at night. Family provides IADLs at baseline. Previously ind with ADLs until recent weeks with increased needs for assist with bathing, dressing, and toileting. FWW at baseline. Currently Ax1 for dressing and cares. SLUMS 3/30, dementia level cognition, daughter reports she is at baseline cognition. Recommending TCU for progression of strength, endurance for improved engagement in ADL sequencing with  consideration for progression to LTC following TCU due to need for 24/7 assist and family's limited ability to provide consistently.   OT Brief overview of current status SLUMS 3/30, cues for intiiation and engagement. Rec TCU with consideration for LTC, daughter understanding   [email or web address]

## 2024-07-17 NOTE — PLAN OF CARE
Physical Therapy Discharge Summary    Reason for therapy discharge:    Discharged to transitional care facility.    Progress towards therapy goal(s). See goals on Care Plan in [facility] electronic health record for goal details.  Goals partially met.  Barriers to achieving goals:   limited tolerance for therapy and discharge from facility.    Therapy recommendation(s):    Continued therapy is recommended.  Rationale/Recommendations:  to address mobility, safety, and endurance for improved safety and quality of life.      Thank you for your referral.  Flori Holloway, PT, DPT, ATC, LAT    [facility]  O: [phone]  E: [email or web address]

## 2024-07-17 NOTE — PROGRESS NOTES
"PRIMARY DIAGNOSIS: \"GENERIC\" NURSING  OUTPATIENT/OBSERVATION GOALS TO BE MET BEFORE DISCHARGE:  ADLs back to baseline: No    Activity and level of assistance: 1 assist with walker/GB    Pain status: Pain free.    Return to near baseline physical activity:  PT/OT consulted, pt weak. Awaiting safe placement.       Please review provider order for any additional goals.   Nurse to notify provider when observation goals have been met and patient is ready for discharge.  "

## 2024-07-17 NOTE — PLAN OF CARE
PRIMARY DIAGNOSIS: ACUTE PAIN  OUTPATIENT/OBSERVATION GOALS TO BE MET BEFORE DISCHARGE:  1. Pain Status: Pain free.    2. Return to near baseline physical activity:  pt has been resting in bed since 2300. Assisted to turn and reposition.    3. Cleared for discharge by consultants (if involved): No- has PT/OT ordered    Discharge Planner Nurse   Safe discharge environment identified: No  Barriers to discharge: No       Entered by: Roselia Dior RN 07/17/2024 3:34 AM     Please review provider order for any additional goals.   Nurse to notify provider when observation goals have been met and patient is ready for discharge.Goal Outcome Evaluation:

## 2024-07-17 NOTE — PROGRESS NOTES
Care Management Discharge Note    Discharge Date: 07/17/2024       Discharge Disposition: Transitional Care    Discharge Services: None    Discharge DME: None    Discharge Transportation: family or friend will provide    Private pay costs discussed: transportation costs, costs for long term care in the future    Does the patient's insurance plan have a 3 day qualifying hospital stay waiver?  Yes     Which insurance plan 3 day waiver is available? Alternative insurance waiver    Will the waiver be used for post-acute placement? Yes    PAS- [id number]     Patient/family educated on Medicare website which has current facility and service quality ratings: yes    Education Provided on the Discharge Plan: Yes    Persons Notified of Discharge Plans: Patient, Daughter- Sherrie, and son- Walter     Patient/Family in Agreement with the Plan: yes    Handoff Referral Completed: No     Additional Information:  [facility]        ACE Flower  [facility]   [phone]

## 2024-07-17 NOTE — PLAN OF CARE
S-(situation): Patient discharging to [facility] via [facility] with  once  gets to facility. Patient signed off to ASHLYN Aguilar as pt is awaiting for [facility] arrival.    B-(background): Observation goals met     A-(assessment): Pt is A to self only otherwise pleasantly confused and follows commands well. Scored 3/30 on slums test today. VSS, afebrile, on room air, denies any pain. Lung sounds CTA, bowels positive, voiding well. Incontinent of urine. Good appetite today, up in chair for meals. Up with assist of 1 with walker/GB.     R-(recommendations): Discharge instructions reviewed with patient. Listed belongings gathered and returned to patient.  Patient Education resolved: Yes  New medications-Pt. Has been educated about reason of use and side effects NA  Home medications returned to patient NA  Medication Bin checked and emptied on discharge Yes

## 2024-07-17 NOTE — PLAN OF CARE
Occupational Therapy Discharge Summary    Reason for therapy discharge:    Discharged to transitional care facility.    Progress towards therapy goal(s). See goals on Care Plan in [facility] electronic health record for goal details.  Goals not met.  Barriers to achieving goals:   discharge on same date as initial evaluation.    Therapy recommendation(s):    Continued therapy is recommended.  Rationale/Recommendations:  Progression of strength, endurance, and ADLs with ongoing monitoring/assessment of cog to inform discharge planning and recommendations.    Thank you for your referral.  Gayatri Britton OTR/AJAY     [facility]  O: [phone]  E: [email or web address]

## 2024-07-17 NOTE — PLAN OF CARE
"SLUMS    The UMS ([facility] Mental Status Exam) is a 30-point standardized cognitive screen used to identify the presence of cognitive deficits and/or to identify a change in cognition over time.  This screen assesses cognitive abilities in various domains.  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 Patient initially unable to state current state, but identifies correctly when provided with three [facility] states.        Calculation and Registration: 0/3, unable to complete          Category Naming with Time Contraint: 0/3, names horses but unable for further naming despite cues       Memory Delayed Recall with Interference: 0/5, unable to state       Registration and Digit Span: 0/2, unable to accurately reverse sequence 3 digit series, does not initiate 4 digit series          Clock drawin/4, able to tell time on the clock on the wall but does not initiate assigning numbers to clock, states \"no I dont think so\"          Visual Spatial: 2/2          Story Recall with Executive Function: 0/8, no carryover of story details immediately following story delivery.                 Total Score: 3/30           Scoring If High School Educated If Less than High School Educated   Normal 27-30 25-30   Mild Neurocognitive Disorder 21-26 20-24   Dementia 1-20 1-19       Score Interpretation: Score places patient in the dementia category.  Please note that this examination is used to screen individuals to look for the presence of cognitive deficits and to identify changes in cognition over time.  This is not a diagnosis.  This examination can be followed by further cognitive assessments if appropriate and deemed necessary.      Total Time with Patient: " 30 minutes on eval and SLUMS    Thank you for your referral.  Gayatri Britton OTR/AJAY     [facility]  O: [phone]  E: [email or web address]

## 2024-07-17 NOTE — PLAN OF CARE
Goal Outcome Evaluation:      Plan of Care Reviewed With: patient    Overall Patient Progress: improving    Outcome Evaluation: Alert& oriented to self only. Calm and pleasant. VSS on room air. denies pain. Saline locked and flushed. Pure wick in use with good output. Rested well between cares.

## 2024-07-17 NOTE — DISCHARGE SUMMARY
[facility]  Hospitalist Discharge Summary      Date of Admission:  7/15/2024  Date of Discharge:  7/17/2024  Discharging Provider: Ceci Noel MD  Discharge Service: Hospitalist Service    Discharge Diagnoses   Falls, sacral insufficiency fractures, inferior pubic ramus and L5 transverse process fracture        Discharge Disposition   Transitional care unit    Condition at discharge: Stable    Hospital Course   This is an 84-year-old hypertension, glaucoma, frequent falls, referred to the ED by PCP after imaging revealed sacral and pubic ramus fracture due to falls.  She was also recently diagnosed with UTI, which was being treated with ciprofloxacin.  Orthopedic surgery consulted in the ED, fractures nonoperative, symptom management.  Hip/pelvic XR showed no acute process.  MRI right hip with acute bilateral sacral insufficiency fracture, left hip not well-evaluated, but findings regarding left superior inferior pubic ramus and L5 transverse process could reflect contusions or undisplaced fractures.  MRI brain without acute intracranial process.  Chronic small vessel ischemic changes.  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  Patient lives alone.  PT consulted, recommended TCU.    Acute bilateral sacral insufficiency fractures  Question of left superior inferior pubic ramus and L5 transverse process fractures  Recurrent falls  -Orthopedic surgery consulted, nonoperative management  -Analgesics as needed for pain control  -Orthostatic vital signs negative  -PT consulted, recommending TCU    Acute kidney injury, possible CKD, suspect prerenal  -Baseline creatinine unknown  -Creatinine 2.24 => 1.84 => 1.49  -Hold ACE-I  -IV fluids discontinued  -Monitor I&O's    Normocytic anemia, appears chronic  -No apparent bleeding  -Monitor hemoglobin    Hypertension  -Resume lisinopril on  discharge  -As needed hydralazine    Hypothyroidism  -Check TSH  -Continue levothyroxine    Glaucoma  -Continue home drops      Consultations This Hospital Stay   PHYSICAL THERAPY ADULT IP CONSULT  OCCUPATIONAL THERAPY ADULT IP CONSULT  CARE MANAGEMENT / SOCIAL WORK IP CONSULT    Code Status   Special Code    Time Spent on this Encounter   I, Ceci Noel MD, personally saw the patient today and spent greater than 30 minutes discharging this patient.       Ceci Noel MD  [address] SURGICAL  [address] [facility]   [place] [address]  Phone: [phone]  ______________________________________________________________________    Physical Exam   Vital Signs: Temp: 97.9  F (36.6  C) Temp src: Oral BP: 129/62 Pulse: 83   Resp: 18 SpO2: 98 % O2 Device: None (Room air)    Weight: 131 lbs 9.83 oz  Constitutional: Awake  Eyes: PERRL, normal conjunctiva  ENT: Trachea midline, no adenopathy  Respiratory: Breath sounds present bilaterally, no wheezing  Cardiovascular: RRR, S1S2  Gastrointestinal: Soft, bowel sounds present  Musculoskeletal: No tenderness or edema  Skin: Warm and dry  Neurological: Alert, responding appropriately to questions, no focal deficits  Psychiatric: Cooperative         Primary Care Physician   Physician No Ref-Primary    Discharge Orders   No discharge procedures on file.    Significant Results and Procedures   Most Recent 3 CBC's:  Recent Labs   Lab Test 07/17/24  0629 07/16/24  0600 07/15/24  1516   WBC 7.7 7.7 9.0   HGB 9.2* 9.1* 10.3*   MCV 91 91 91    193 223     Most Recent 3 BMP's:  Recent Labs   Lab Test 07/17/24  0629 07/16/24  0600 07/15/24  1516    137 135   POTASSIUM 4.4 4.3 4.4   CHLORIDE 105 107 103   CO2 22 20* 21*   BUN 35.8* 53.0* 62.4*   CR 1.49* 1.84* 2.24*   ANIONGAP 8 10 11   DIONISIO 8.6* 8.6* 9.2   GLC 97 100* 112*     Most Recent Urinalysis:  Recent Labs   Lab Test 07/15/24  1615   COLOR Yellow   APPEARANCE Clear   URINEGLC Negative    URINEBILI Negative   URINEKETONE Negative   SG 1.017   UBLD Negative   URINEPH 5.0   PROTEIN Negative   NITRITE Negative   LEUKEST Negative   RBCU 1   WBCU 1   , No results found for this or any previous visit.    Discharge Medications   Current Discharge Medication List        CONTINUE these medications which have NOT CHANGED    Details   ciprofloxacin (CIPRO) 500 MG tablet Take 500 mg by mouth 2 times daily Take 1 tab by mouth every 12 hours for 7 days      latanoprost (XALATAN) 0.005 % ophthalmic solution Place 1 drop into both eyes daily 1 drop to both eyes at bedtime      levothyroxine (SYNTHROID/LEVOTHROID) 88 MCG tablet Take 88 mcg by mouth daily 1 tablet by mouth every morning.      lisinopril (ZESTRIL) 20 MG tablet Take 20 mg by mouth daily 1 tablet by mouth every morning.      timolol maleate (TIMOPTIC) 0.5 % ophthalmic solution Place 1 drop into both eyes daily 1 drop to both eyes every morning.           Allergies   Allergies   Allergen Reactions    Cephalexin Hives    Codeine Hives    Aspirin Other (See Comments)    Doxycycline Rash

## 2024-08-06 ENCOUNTER — MEDICAL CORRESPONDENCE (OUTPATIENT)
Dept: HEALTH INFORMATION MANAGEMENT | Facility: CLINIC | Age: 84
End: 2024-08-06
Payer: COMMERCIAL

## 2024-08-07 ENCOUNTER — LAB REQUISITION (OUTPATIENT)
Dept: LAB | Facility: CLINIC | Age: 84
End: 2024-08-07

## 2024-08-07 DIAGNOSIS — D64.9 ANEMIA, UNSPECIFIED: ICD-10-CM

## 2024-08-08 LAB — HGB BLD-MCNC: 8.2 G/DL (ref 11.7–15.7)

## 2024-08-08 PROCEDURE — 36415 COLL VENOUS BLD VENIPUNCTURE: CPT | Performed by: NURSE PRACTITIONER

## 2024-08-08 PROCEDURE — 85018 HEMOGLOBIN: CPT | Performed by: NURSE PRACTITIONER

## 2024-08-08 PROCEDURE — P9603 ONE-WAY ALLOW PRORATED MILES: HCPCS | Performed by: NURSE PRACTITIONER

## 2024-08-12 ENCOUNTER — LAB REQUISITION (OUTPATIENT)
Dept: LAB | Facility: CLINIC | Age: 84
End: 2024-08-12

## 2024-08-13 LAB — HGB BLD-MCNC: 8.1 G/DL (ref 11.7–15.7)

## 2024-08-13 PROCEDURE — 85018 HEMOGLOBIN: CPT | Performed by: NURSE PRACTITIONER

## 2024-08-13 PROCEDURE — 36415 COLL VENOUS BLD VENIPUNCTURE: CPT | Performed by: NURSE PRACTITIONER

## 2024-08-13 PROCEDURE — P9603 ONE-WAY ALLOW PRORATED MILES: HCPCS | Performed by: NURSE PRACTITIONER

## 2024-08-14 PROCEDURE — 82270 OCCULT BLOOD FECES: CPT | Performed by: NURSE PRACTITIONER

## 2024-08-15 ENCOUNTER — LAB REQUISITION (OUTPATIENT)
Dept: LAB | Facility: CLINIC | Age: 84
End: 2024-08-15

## 2024-08-15 DIAGNOSIS — D64.9 ANEMIA, UNSPECIFIED: ICD-10-CM

## 2024-08-15 LAB
HEMOCCULT SP1 STL QL: POSITIVE
HEMOCCULT SP2 STL QL: POSITIVE

## 2024-08-19 ENCOUNTER — LAB REQUISITION (OUTPATIENT)
Dept: LAB | Facility: CLINIC | Age: 84
End: 2024-08-19

## 2024-08-19 DIAGNOSIS — D64.9 ANEMIA, UNSPECIFIED: ICD-10-CM

## 2024-08-20 LAB — HGB BLD-MCNC: 8.1 G/DL (ref 11.7–15.7)

## 2024-08-20 PROCEDURE — 85018 HEMOGLOBIN: CPT | Performed by: NURSE PRACTITIONER

## 2024-08-20 PROCEDURE — P9603 ONE-WAY ALLOW PRORATED MILES: HCPCS | Performed by: NURSE PRACTITIONER

## 2024-08-20 PROCEDURE — 36415 COLL VENOUS BLD VENIPUNCTURE: CPT | Performed by: NURSE PRACTITIONER
